# Patient Record
Sex: FEMALE | Race: WHITE | NOT HISPANIC OR LATINO | URBAN - METROPOLITAN AREA
[De-identification: names, ages, dates, MRNs, and addresses within clinical notes are randomized per-mention and may not be internally consistent; named-entity substitution may affect disease eponyms.]

---

## 2017-01-01 ENCOUNTER — HOME CARE/HOSPICE - HEALTHEAST (OUTPATIENT)
Dept: HOME HEALTH SERVICES | Facility: HOME HEALTH | Age: 82
End: 2017-01-01

## 2017-01-01 ENCOUNTER — OFFICE VISIT - HEALTHEAST (OUTPATIENT)
Dept: GERIATRICS | Facility: CLINIC | Age: 82
End: 2017-01-01

## 2017-01-01 ENCOUNTER — HOSPITAL ENCOUNTER (OUTPATIENT)
Dept: NEUROLOGY | Facility: CLINIC | Age: 82
Setting detail: THERAPIES SERIES
Discharge: STILL ADMITTED OR ADMINISTRATIVE | End: 2017-04-12
Attending: PSYCHIATRY & NEUROLOGY

## 2017-01-01 ENCOUNTER — COMMUNICATION - HEALTHEAST (OUTPATIENT)
Dept: INTENSIVE CARE | Facility: HOSPITAL | Age: 82
End: 2017-01-01

## 2017-01-01 ENCOUNTER — OFFICE VISIT - HEALTHEAST (OUTPATIENT)
Dept: CARDIOLOGY | Facility: CLINIC | Age: 82
End: 2017-01-01

## 2017-01-01 ENCOUNTER — HOSPITAL ENCOUNTER (OUTPATIENT)
Dept: NEUROLOGY | Facility: CLINIC | Age: 82
Setting detail: THERAPIES SERIES
Discharge: STILL ADMITTED OR ADMINISTRATIVE | End: 2017-05-05
Attending: PSYCHIATRY & NEUROLOGY

## 2017-01-01 ENCOUNTER — RECORDS - HEALTHEAST (OUTPATIENT)
Dept: LAB | Facility: CLINIC | Age: 82
End: 2017-01-01

## 2017-01-01 ENCOUNTER — HOSPITAL ENCOUNTER (OUTPATIENT)
Dept: OCCUPATIONAL THERAPY | Age: 82
Setting detail: THERAPIES SERIES
Discharge: STILL ADMITTED OR ADMINISTRATIVE | End: 2017-06-01
Attending: NURSE PRACTITIONER

## 2017-01-01 ENCOUNTER — AMBULATORY - HEALTHEAST (OUTPATIENT)
Dept: GERIATRICS | Facility: CLINIC | Age: 82
End: 2017-01-01

## 2017-01-01 ENCOUNTER — RECORDS - HEALTHEAST (OUTPATIENT)
Dept: ADMINISTRATIVE | Facility: OTHER | Age: 82
End: 2017-01-01

## 2017-01-01 ENCOUNTER — AMBULATORY - HEALTHEAST (OUTPATIENT)
Dept: NEUROLOGY | Facility: CLINIC | Age: 82
End: 2017-01-01

## 2017-01-01 ENCOUNTER — AMBULATORY - HEALTHEAST (OUTPATIENT)
Dept: ADMINISTRATIVE | Facility: CLINIC | Age: 82
End: 2017-01-01

## 2017-01-01 ENCOUNTER — COMMUNICATION - HEALTHEAST (OUTPATIENT)
Dept: NEUROLOGY | Facility: CLINIC | Age: 82
End: 2017-01-01

## 2017-01-01 ENCOUNTER — AMBULATORY - HEALTHEAST (OUTPATIENT)
Dept: CARDIOLOGY | Facility: CLINIC | Age: 82
End: 2017-01-01

## 2017-01-01 ENCOUNTER — SURGERY - HEALTHEAST (OUTPATIENT)
Dept: GASTROENTEROLOGY | Facility: HOSPITAL | Age: 82
End: 2017-01-01

## 2017-01-01 ENCOUNTER — HOSPITAL ENCOUNTER (OUTPATIENT)
Dept: NEUROLOGY | Facility: CLINIC | Age: 82
Setting detail: THERAPIES SERIES
Discharge: STILL ADMITTED OR ADMINISTRATIVE | End: 2017-06-09
Attending: NURSE PRACTITIONER

## 2017-01-01 ENCOUNTER — COMMUNICATION - HEALTHEAST (OUTPATIENT)
Dept: CARE COORDINATION | Facility: CLINIC | Age: 82
End: 2017-01-01

## 2017-01-01 ENCOUNTER — HOSPITAL ENCOUNTER (OUTPATIENT)
Dept: SPEECH THERAPY | Age: 82
Setting detail: THERAPIES SERIES
Discharge: STILL ADMITTED OR ADMINISTRATIVE | End: 2017-06-15
Attending: NURSE PRACTITIONER

## 2017-01-01 ENCOUNTER — HOSPITAL ENCOUNTER (OUTPATIENT)
Dept: OCCUPATIONAL THERAPY | Age: 82
Setting detail: THERAPIES SERIES
Discharge: STILL ADMITTED OR ADMINISTRATIVE | End: 2017-06-15
Attending: NURSE PRACTITIONER

## 2017-01-01 ENCOUNTER — HOSPITAL ENCOUNTER (OUTPATIENT)
Dept: PHYSICAL THERAPY | Age: 82
Setting detail: THERAPIES SERIES
Discharge: STILL ADMITTED OR ADMINISTRATIVE | End: 2017-06-15
Attending: NURSE PRACTITIONER

## 2017-01-01 ENCOUNTER — HOSPITAL ENCOUNTER (OUTPATIENT)
Dept: SPEECH THERAPY | Age: 82
Setting detail: THERAPIES SERIES
Discharge: STILL ADMITTED OR ADMINISTRATIVE | End: 2017-06-01
Attending: NURSE PRACTITIONER

## 2017-01-01 ENCOUNTER — HOSPITAL ENCOUNTER (OUTPATIENT)
Dept: NEUROLOGY | Facility: CLINIC | Age: 82
Setting detail: THERAPIES SERIES
Discharge: STILL ADMITTED OR ADMINISTRATIVE | End: 2017-03-15
Attending: PSYCHIATRY & NEUROLOGY

## 2017-01-01 ENCOUNTER — OFFICE VISIT - HEALTHEAST (OUTPATIENT)
Dept: SURGERY | Facility: CLINIC | Age: 82
End: 2017-01-01

## 2017-01-01 DIAGNOSIS — K57.92 DIVERTICULITIS: ICD-10-CM

## 2017-01-01 DIAGNOSIS — K59.00 CONSTIPATION: ICD-10-CM

## 2017-01-01 DIAGNOSIS — G20.A1 PARKINSON'S DISEASE (H): ICD-10-CM

## 2017-01-01 DIAGNOSIS — I95.1 ORTHOSTATIC HYPOTENSION: ICD-10-CM

## 2017-01-01 DIAGNOSIS — R00.0 TACHYCARDIA: ICD-10-CM

## 2017-01-01 DIAGNOSIS — D64.9 ANEMIA: ICD-10-CM

## 2017-01-01 DIAGNOSIS — I10 ESSENTIAL HYPERTENSION: ICD-10-CM

## 2017-01-01 DIAGNOSIS — R42 DIZZINESS: ICD-10-CM

## 2017-01-01 DIAGNOSIS — R49.0 DYSPHONIA: ICD-10-CM

## 2017-01-01 DIAGNOSIS — D64.9 ANEMIA, UNSPECIFIED TYPE: ICD-10-CM

## 2017-01-01 DIAGNOSIS — F32.A DEPRESSION: ICD-10-CM

## 2017-01-01 DIAGNOSIS — D64.9 SYMPTOMATIC ANEMIA: ICD-10-CM

## 2017-01-01 DIAGNOSIS — G20.A1 PARKINSON DISEASE (H): ICD-10-CM

## 2017-01-01 DIAGNOSIS — F03.90 DEMENTIA (H): ICD-10-CM

## 2017-01-01 DIAGNOSIS — K57.92 ACUTE DIVERTICULITIS: ICD-10-CM

## 2017-01-01 DIAGNOSIS — R53.1 WEAKNESS: ICD-10-CM

## 2017-01-01 DIAGNOSIS — K31.819 GASTRIC AVM: ICD-10-CM

## 2017-01-01 DIAGNOSIS — R53.81 DEBILITY: ICD-10-CM

## 2017-01-01 DIAGNOSIS — I10 HTN (HYPERTENSION), BENIGN: ICD-10-CM

## 2017-01-01 DIAGNOSIS — R27.9 LACK OF COORDINATION: ICD-10-CM

## 2017-01-01 DIAGNOSIS — R93.89 ABNORMAL CT SCAN: ICD-10-CM

## 2017-01-01 DIAGNOSIS — I10 HYPERTENSION: ICD-10-CM

## 2017-01-01 DIAGNOSIS — E87.6 HYPOKALEMIA: ICD-10-CM

## 2017-01-01 DIAGNOSIS — K92.1 MELENA: ICD-10-CM

## 2017-01-01 DIAGNOSIS — R53.81 PHYSICAL DECONDITIONING: ICD-10-CM

## 2017-01-01 DIAGNOSIS — R06.09 DOE (DYSPNEA ON EXERTION): ICD-10-CM

## 2017-01-01 DIAGNOSIS — R26.89 DECREASED FUNCTIONAL MOBILITY: ICD-10-CM

## 2017-01-01 DIAGNOSIS — I10 HTN (HYPERTENSION): ICD-10-CM

## 2017-01-01 DIAGNOSIS — R25.1 TREMOR: ICD-10-CM
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
## 2017-01-01 DIAGNOSIS — R06.82 TACHYPNEA: ICD-10-CM

## 2017-01-01 DIAGNOSIS — R53.81 DEBILITY, UNSPECIFIED: ICD-10-CM

## 2017-01-01 DIAGNOSIS — K63.89 COLONIC MASS: ICD-10-CM

## 2017-01-01 DIAGNOSIS — G47.52 REM SLEEP BEHAVIOR DISORDER: ICD-10-CM

## 2017-01-01 DIAGNOSIS — R53.1 WEAKNESS GENERALIZED: ICD-10-CM

## 2017-01-01 DIAGNOSIS — G31.84 MCI (MILD COGNITIVE IMPAIRMENT): ICD-10-CM

## 2017-01-01 DIAGNOSIS — R53.1 GENERAL WEAKNESS: ICD-10-CM

## 2017-01-01 DIAGNOSIS — K92.2 ACUTE UPPER GI BLEED: ICD-10-CM

## 2017-01-01 DIAGNOSIS — R53.81 DEBILITATED PATIENT: ICD-10-CM

## 2017-01-01 DIAGNOSIS — R41.89 COGNITIVE DECLINE: ICD-10-CM

## 2017-01-01 DIAGNOSIS — I99.8 BLOOD PRESSURE INSTABILITY: ICD-10-CM

## 2017-01-01 DIAGNOSIS — I95.9 HYPOTENSION: ICD-10-CM

## 2017-01-01 DIAGNOSIS — R62.7 FTT (FAILURE TO THRIVE) IN ADULT: ICD-10-CM

## 2017-01-01 LAB
BASOPHILS # BLD AUTO: 0 THOU/UL (ref 0–0.2)
BASOPHILS NFR BLD AUTO: 1 % (ref 0–2)
CEA SERPL-MCNC: 1.7 NG/ML (ref 0–3)
EOSINOPHIL # BLD AUTO: 0 THOU/UL (ref 0–0.4)
EOSINOPHIL NFR BLD AUTO: 0 % (ref 0–6)
ERYTHROCYTE [DISTWIDTH] IN BLOOD BY AUTOMATED COUNT: 26.1 % (ref 11–14.5)
HCT VFR BLD AUTO: 30.8 % (ref 35–47)
HGB BLD-MCNC: 9 G/DL (ref 12–16)
LAB AP CHARGES (HE HISTORICAL CONVERSION): NORMAL
LYMPHOCYTES # BLD AUTO: 1.6 THOU/UL (ref 0.8–4.4)
LYMPHOCYTES NFR BLD AUTO: 25 % (ref 20–40)
MCH RBC QN AUTO: 24.3 PG (ref 27–34)
MCHC RBC AUTO-ENTMCNC: 29.2 G/DL (ref 32–36)
MCV RBC AUTO: 83 FL (ref 80–100)
MONOCYTES # BLD AUTO: 0.8 THOU/UL (ref 0–0.9)
MONOCYTES NFR BLD AUTO: 12 % (ref 2–10)
NEUTROPHILS # BLD AUTO: 3.9 THOU/UL (ref 2–7.7)
NEUTROPHILS NFR BLD AUTO: 63 % (ref 50–70)
PATH REPORT.COMMENTS IMP SPEC: NORMAL
PATH REPORT.COMMENTS IMP SPEC: NORMAL
PATH REPORT.FINAL DX SPEC: NORMAL
PATH REPORT.MICROSCOPIC SPEC OTHER STN: ABNORMAL
PLATELET # BLD AUTO: 188 THOU/UL (ref 140–440)
PMV BLD AUTO: 11 FL (ref 8.5–12.5)
RBC # BLD AUTO: 3.7 MILL/UL (ref 3.8–5.4)
WBC: 6.3 THOU/UL (ref 4–11)

## 2017-01-01 RX ORDER — CARBIDOPA, LEVODOPA AND ENTACAPONE 50; 200; 200 MG/1; MG/1; MG/1
0.5 TABLET, FILM COATED ORAL 2 TIMES DAILY
Status: SHIPPED | COMMUNITY
Start: 2017-01-01

## 2017-01-01 RX ORDER — AMLODIPINE BESYLATE 2.5 MG/1
2.5 TABLET ORAL DAILY
Status: SHIPPED | COMMUNITY
Start: 2017-01-01

## 2017-01-01 RX ORDER — POLYETHYLENE GLYCOL 3350 17 G/17G
17 POWDER, FOR SOLUTION ORAL DAILY
Status: SHIPPED | COMMUNITY
Start: 2017-01-01

## 2017-01-01 RX ORDER — MECLIZINE HCL 12.5 MG 12.5 MG/1
12.5 TABLET ORAL DAILY
Status: SHIPPED | COMMUNITY
Start: 2017-01-01

## 2017-01-01 RX ORDER — ACETAMINOPHEN 325 MG/1
650 TABLET ORAL 4 TIMES DAILY PRN
Status: SHIPPED | COMMUNITY
Start: 2017-01-01

## 2017-01-01 ASSESSMENT — MIFFLIN-ST. JEOR
SCORE: 1139.39
SCORE: 1138.94
SCORE: 1120.34
SCORE: 1106.74
SCORE: 1123.97
SCORE: 997.87
SCORE: 1161.62

## 2021-05-25 ENCOUNTER — RECORDS - HEALTHEAST (OUTPATIENT)
Dept: ADMINISTRATIVE | Facility: CLINIC | Age: 86
End: 2021-05-25

## 2021-05-26 ENCOUNTER — RECORDS - HEALTHEAST (OUTPATIENT)
Dept: ADMINISTRATIVE | Facility: CLINIC | Age: 86
End: 2021-05-26

## 2021-05-27 ENCOUNTER — RECORDS - HEALTHEAST (OUTPATIENT)
Dept: ADMINISTRATIVE | Facility: CLINIC | Age: 86
End: 2021-05-27

## 2021-05-28 ENCOUNTER — RECORDS - HEALTHEAST (OUTPATIENT)
Dept: ADMINISTRATIVE | Facility: CLINIC | Age: 86
End: 2021-05-28

## 2021-05-29 ENCOUNTER — RECORDS - HEALTHEAST (OUTPATIENT)
Dept: ADMINISTRATIVE | Facility: CLINIC | Age: 86
End: 2021-05-29

## 2021-05-30 VITALS — WEIGHT: 127 LBS | BODY MASS INDEX: 18.75 KG/M2

## 2021-05-30 VITALS — WEIGHT: 127.6 LBS | BODY MASS INDEX: 18.84 KG/M2

## 2021-05-30 VITALS — HEIGHT: 69 IN | BODY MASS INDEX: 21.05 KG/M2 | WEIGHT: 142.1 LBS

## 2021-05-30 VITALS — BODY MASS INDEX: 18.87 KG/M2 | WEIGHT: 127.8 LBS

## 2021-05-30 VITALS — BODY MASS INDEX: 19.18 KG/M2 | WEIGHT: 129.9 LBS

## 2021-05-30 VITALS — BODY MASS INDEX: 19.82 KG/M2 | WEIGHT: 134.2 LBS

## 2021-05-30 VITALS — BODY MASS INDEX: 18.78 KG/M2 | WEIGHT: 127.2 LBS

## 2021-05-30 VITALS — BODY MASS INDEX: 20.88 KG/M2 | WEIGHT: 141.4 LBS

## 2021-05-30 VITALS — WEIGHT: 131.2 LBS | BODY MASS INDEX: 19.37 KG/M2

## 2021-05-31 VITALS — HEIGHT: 66 IN | WEIGHT: 121.5 LBS | BODY MASS INDEX: 19.53 KG/M2

## 2021-06-02 ENCOUNTER — RECORDS - HEALTHEAST (OUTPATIENT)
Dept: ADMINISTRATIVE | Facility: CLINIC | Age: 86
End: 2021-06-02

## 2021-06-09 NOTE — PROGRESS NOTES
Assessment:     1. Parkinson's Disease  2. Depression  3. History of low Hgb  4. Generalized weakness    Plan:     PD: no changes in medications  Therapies: continue with home health  Exercise- encouraged  REM Sleep Disorder -start Nuplazid   Falls - monitor  Anxiety/Depressed- start Nuplazid, continue with Remeron and Venlafaxine  Next Appointment - with me in 4 weeks    The patient returns to the [facility] for a follow up visit, the patient has been in the hospital due to low Hgb, she feels really week. She complains of daytime sleepiness and reports that her sleep is poor so I will start the patient on Nuplazid. I did ask the facility to check CBC, BMP and the check orthostatic blood pressure manually.     Subjective:        PD summary: Dr. Black initially saw Mrs. Hills in 8/2013. She is a right handed white female who started to have right side tremor about in about 2010. He was initially diagnosed with Parkinson disease by her neurologist at [facility]. She has been placed on carbidopa, levodopa. She was increased to 50/200 extended release 3 times a day but she felt lightheaded and dizzy, subsequently she decreased to 1 pill in the morning, half tablets in the early afternoon. Hand writing was getting smaller. She did feel the medication was helping her left-sided tremor. She has some mild balance issue but it was multifactorial. She has been seen at [facility], she was not satisfied with their care, came to this clinic after contacting [facility] and after looking up all the doctors names for PD. Last visit with Mariel[facility] was about summer of 2015. Was not been able to walk Sept 2015, due to weakness. Has therapies without benefit (PD therapies.). She took her self off Sinemet 25/250 TID thanksgiving of 2015. Still took Sinemet CR 50/200 HS. She cannot push up to get out of chair, speech softer, Can walk only cross the room, about 4.5 min for 25 feet. Very slow. Right leg drags more.  No tremor, feels SOB. No known lung and heart conditions. She is trying inspirators. She feels in general, she is a little clumsy feeding herself, but no particularly dysphagia. Symptoms worsen after the d/c of the sinemet.     She has been very active and exercise regularly. She lives with her son who helps with things around the house. She does have a cane, normally walks with that. She is hard of hearing and wears hearing aids. She did lost right side hearing aid, but is wearing the left side. She has a hysterectomy, bilateral hip surgery x4, knee surgery in 2006, had cholecystectomy and left kidney stones. She has a history of hypertension, osteoporosis, asthma, monoclonal gammopathy of unknown significance, knee surgery, glaucoma, hypernephroma surgically resected on the left side.     PD: Sinemet 25/100, 2 tabs BID noon and 5:30 pm. Taking 50/200 CR 1 tab BID at 8:30 am, and 10 pm. NO side effects. NO clear wearing off  RLS- . denies  ADL -  Is assist of one with ADL, she is able to ambulate but she is very week  Driving- does not drive   Exercise - presently has therapies coming to her house  Falls - denies  History of low Hgb- was in the hospital for 6 days Hgb was 6.0, is being monitored by primary  Medication Side Effects - denies any side effects  Memory/Mild cognitive impairment -feeling ok. No changes. MOCA 25/30 in 5/2016  Anxiety/Depression - patient is very tired, is currently on Remeron and Venlafaxine, will see if sleeping helps mood  REM sleep disorder: complains of vivid dreams, daytime sleepiness, her sleep is poor, she never feels rested start Nuplazid  Dyskinesia- none  Orthostatics: reports no light headedness  Advanced Directives - completed  Surgery - dementia level of impairment not a good candidate  Drooling - denies problem    Patient Active Problem List    Diagnosis Date Noted     Constipation 03/08/2017     Symptomatic anemia 02/27/2017     Fall 02/27/2017     SOB (shortness of breath)  02/27/2017     Gastric AVM 02/27/2017     Melena 02/27/2017     Parkinson disease 05/18/2016     Arthritis 05/18/2016     Memory difficulty 05/18/2016     Drooling 05/18/2016     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     HTN (hypertension), benign 05/18/2016     [place] (hard of hearing), bilateral 05/18/2016     Tremor 08/02/2014     Past Medical History:   Diagnosis Date     Anemia      Asthma      Glaucoma      HTN (hypertension)      OP (osteoporosis)      Parkinson disease      Vitamin D deficiency      Word finding difficulty 2016     Past Surgical History:   Procedure Laterality Date     ESOPHAGOGASTRODUODENOSCOPY N/A 2/27/2017    Procedure: ESOPHAGOGASTRODUODENOSCOPY (EGD) with APC cautery usage;  Surgeon: Kelton Sr MD;  Location: [facility];  Service:      HIP ARTHROPLASTY Right      HIP SURGERY Left      HYSTERECTOMY       KNEE SURGERY Right      NEPHRECTOMY       Family History   Problem Relation Age of Onset     Parkinsonism Paternal Uncle      Heart disease Mother      Heart attack Father      Pneumonia Brother      Heart murmur Daughter      Diabetes Son      No Medical Problems Son      Current Outpatient Prescriptions   Medication Sig Dispense Refill     carbidopa-levodopa (SINEMET CR)  mg per tablet Take 0.5-1 tablets by mouth 2 (two) times a day. Take 1 tab every am and take 0.5 tab at 1500        carbidopa-levodopa (SINEMET)  mg per tablet Take 2 tablets by mouth 2 (two) times a day. Take around noon and 6pm       cholecalciferol, vitamin D3, 5,000 unit Tab Take 1 tablet by mouth daily.       ferrous sulfate 325 (65 FE) MG tablet Take 1 tablet by mouth daily with breakfast.       mirtazapine (REMERON) 45 MG tablet Take 45 mg by mouth bedtime.       omeprazole (PRILOSEC) 20 MG capsule Take 1 capsule (20 mg total) by mouth 2 (two) times a day before meals. 60 capsule 0     metoprolol tartrate (LOPRESSOR) 25 MG tablet Take 25 mg by mouth 2 (two) times a day. Indications:  hypertension       pimavanserin (NUPLAZID) 17 mg Tab tablet Take 2 tablets (34 mg total) by mouth daily. 60 tablet 6     venlafaxine (EFFEXOR XR) 37.5 MG 24 hr capsule Take 1 capsule (37.5 mg total) by mouth daily. 30 capsule 3     No current facility-administered medications for this encounter.        Allergies   Allergen Reactions     Crestor [Rosuvastatin]      Paroxetine      Penicillins      Pramipexole      Statins-Hmg-Coa Reductase Inhibitors      Zetia [Ezetimibe]      Social History     Social History     Marital status:      Spouse name: N/A     Number of children: N/A     Years of education: N/A     Occupational History     Not on file.     Social History Main Topics     Smoking status: Never Smoker     Smokeless tobacco: Never Used     Alcohol use No     Drug use: No     Sexual activity: No     Other Topics Concern     Not on file     Social History Narrative    Her son lives with her in a single family home       The following portions of the patient's history were reviewed and updated as appropriate: allergies, current medications, past family history, past medical history, past social history, past surgical history and problem list.    Review of Systems  A comprehensive review of systems was negative except for: what was noted above      Objective:     Vitals:    03/15/17 1343   BP: 140/70   Pulse: 66     I asked her to call with any questions or concerns and will see her again in clinic in about 4 weeks . We discussed the risks and benefits of the medication including risk of worsening depression with medication adjustments and even the possibility of emergence of suicidally      Total time spent with the patient today was 45 minutes with greater than 50% of the time spent in counseling and care coordination.       She is a very thin and frail looking, but looking better. Left leg is about 1 to 2 inches shorter than the right. Right ankle with brace. She has normal mental status, language and  but a little soft speech. Cranial nerves 2 through 12 significant for hard of hearing (She did lost right side hearing aid, but is wearing the left side) and wearing glasses, otherwise unremarkable. She is sitting in chair comfortable, in no acute distress. Language normal. She has decreased muscle bulk in right legs and complete foot drop on the right side with flaccid tone around the ankle. Some numb feeling in right leg. Absent deep tendon reflexes in bilateral ankles, diminished in the knees bilaterally.  No Babinski sign. No tremors, no rigidities in upper extremities. Hand coordination seemed to be poor, worse on right, decreased amplitudes. Some ulnar deviation of fingers and contractions in hands, worse on right. She does have more spontaneous movement in the left side upper extremity, compared to the right side. Gait and station deferred. There is no leg swelling at this point. She is atraumatic, normocephalic. Some drooling.

## 2021-06-09 NOTE — PROGRESS NOTES
Code Status:  FULL CODE  Visit Type: Problem Visit (htn,loose stool)     Facility:  [facility] [[id number]]        Facility Type: SNF (Skilled Nursing Facility, TCU)    History of Present Illness: Kita Hills is a 84 y.o. female Who I'm seen today because of purported loose stools and following up on her laboratory in blood pressures. Apparently she was given like you lost beyond the one day that was requested and was just discontinued several days ago. In addition she continued on her senna S for regimen and because of this a to loose stools yesterday a. This is her report staff did not take it that themselves. Additionally she is feeling slightly weaker because of the above. No other complaints this time     Review of Systems     Physical Exam   Constitutional:   Disappear a somewhat a weak but not sedated   Cardiovascular: Normal rate.    Pulmonary/Chest: Effort normal and breath sounds normal.   Abdominal: Soft. Bowel sounds are normal. She exhibits no distension. There is no tenderness. There is no rebound.   Vitals reviewed.      Labs:  All labs reviewed in the nursing home record.  Recent Results (from the past 240 hour(s))   Iron   Result Value Ref Range    Iron 75 42 - 175 ug/dL   Potassium   Result Value Ref Range    Potassium 3.6 3.5 - 5.0 mmol/L   HM1 (CBC with Diff)   Result Value Ref Range    WBC 7.2 4.0 - 11.0 thou/uL    RBC 4.00 3.80 - 5.40 mill/uL    Hemoglobin 9.1 (L) 12.0 - 16.0 g/dL    Hematocrit 32.5 (L) 35.0 - 47.0 %    MCV 81 80 - 100 fL    MCH 22.8 (L) 27.0 - 34.0 pg    MCHC 28.0 (L) 32.0 - 36.0 g/dL    RDW 24.4 (H) 11.0 - 14.5 %    Platelets 262 140 - 440 thou/uL    MPV 11.6 8.5 - 12.5 fL   Manual Differential   Result Value Ref Range    Total Neutrophils % 61 50 - 70 %    Lymphocytes % 32 20 - 40 %    Monocytes % 5 2 - 10 %    Eosinophils %  1 0 - 6 %    Basophils % 0 0 - 2 %    Myelocytes % 1 <=1 %    Total Neutrophils Absolute 4.4 2.0 - 7.7 thou/ul    Lymphocytes  Absolute 2.3 0.8 - 4.4 thou/uL    Monocytes Absolute 0.4 0.0 - 0.9 thou/uL    Eosinophils Absolute 0.1 0.0 - 0.4 thou/uL    Basophils Absolute 0.0 0.0 - 0.2 thou/uL    Myelocytes Absolute 0.1 <=0.1 thou/uL    Platelet Estimate Normal Normal    Ovalocytes 1+ (!) Negative    Polychromasia 1+ (!) Negative   Basic Metabolic Panel   Result Value Ref Range    Sodium 139 136 - 145 mmol/L    Potassium 3.7 3.5 - 5.0 mmol/L    Chloride 101 98 - 107 mmol/L    CO2 29 22 - 31 mmol/L    Anion Gap, Calculation 9 5 - 18 mmol/L    Glucose 78 70 - 125 mg/dL    Calcium 9.1 8.5 - 10.5 mg/dL    BUN 21 8 - 28 mg/dL    Creatinine 0.79 0.60 - 1.10 mg/dL    GFR MDRD Af Amer >60 >60 mL/min/1.73m2    GFR MDRD Non Af Amer >60 >60 mL/min/1.73m2   HM2(CBC w/o Differential)   Result Value Ref Range    WBC 6.3 4.0 - 11.0 thou/uL    RBC 3.84 3.80 - 5.40 mill/uL    Hemoglobin 9.3 (L) 12.0 - 16.0 g/dL    Hematocrit 31.2 (L) 35.0 - 47.0 %    MCV 81 80 - 100 fL    MCH 24.2 (L) 27.0 - 34.0 pg    MCHC 29.8 (L) 32.0 - 36.0 g/dL    RDW 25.6 (H) 11.0 - 14.5 %    Platelets 198 140 - 440 thou/uL    MPV 11.0 8.5 - 12.5 fL         Assessment:  1. Constipation     2. Hypertension     3. Hypokalemia         Plan: We will adjust her bow regimen so that she does not get the lactulose. The senna S will be reduced to one BID PRN constipation. We reviewed that her creatinine did improve to normal from 1.13 -.79 and her potassium also improve from 3.4- 3.7. Her blood pressure has waffled somewhat with low reading in the 90 systolic yesterday and 144 today. We will continue to observe this.      25 minutes spent of which greater than 75% was face to face communication with the patient about above plan of care    Electronically signed by: Vignesh Vaz MD

## 2021-06-09 NOTE — PROGRESS NOTES
Code Status:  FULL CODE  Visit Type: H & P     Facility:  [facility] [[id number]]      Facility Type: SNF (Skilled Nursing Facility, TCU)    History of Present Illness:   Hospital Admission Date: February 26, 2017 [facility] Discharge Date: March 2, 2017  Facility Admission Date: March 2, 2017 [facility]    Kita Hills is a 84 y.o. female Was an underlying history of Parkinson's disease, hypertension and chronic constipation. She had become the progressively short of breath we for 3 to 4 days nonproductive cough and had had a mechanical fall. She had no loss of consciousness or head injury during the fall.    Emergency department course patient was seen in the emergency department and was found to have a hemoglobin of six which was a 5 g drop from the previous year. For this reason she was worked up. She did at that juncture admit to [facility] not ask stools. She never had bright red blood per rectum. She had a brief episode of urinary incontinence prior to this. There had been no history of dyspepsia or nonsteroidal anti-inflammatory use, tobacco, alcohol, but her appetite had been decreased in she'd been overall getting weaker.    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 She had no further bleeding in her hemoglobin stabilized after that. They started her on omeprazole and recommended because of her we can state a stay in the TCU for her functional decline. She does live with her son but this concerned that they may not be able to fully care for her and trial and facility to strengthen to see if she can get back to her baseline. They did in addition to the two units of packed red cells give her vendor for partial dose prior to discharge. They felt that her iron stores were probably depleted.    Past Medical History:   Diagnosis Date     Anemia      Asthma       Glaucoma      HTN (hypertension)      OP (osteoporosis)      Parkinson disease      Vitamin D deficiency      Word finding difficulty 2016     Past Surgical History:   Procedure Laterality Date     ESOPHAGOGASTRODUODENOSCOPY N/A 2/27/2017    Procedure: ESOPHAGOGASTRODUODENOSCOPY (EGD) with APC cautery usage;  Surgeon: Kelton Sr MD;  Location: [facility];  Service:      HIP ARTHROPLASTY Right      HIP SURGERY Left      HYSTERECTOMY       KNEE SURGERY Right      NEPHRECTOMY       Family History   Problem Relation Age of Onset     Parkinsonism Paternal Uncle      Heart disease Mother      Heart attack Father      Pneumonia Brother      Heart murmur Daughter      Diabetes Son      No Medical Problems Son      Social History     Social History     Marital status:      Spouse name: N/A     Number of children: N/A     Years of education: N/A     Occupational History     Not on file.     Social History Main Topics     Smoking status: Never Smoker     Smokeless tobacco: Never Used     Alcohol use No     Drug use: No     Sexual activity: No     Other Topics Concern     Not on file     Social History Narrative    Her son lives with her in a single family home     Additional Geriatric ReviewPatient has not driven for three years her  passed away three years ago. She she does not cook and lives with her son who does most of her cares. She is difficulties with stairs. Certain things she still very cognitively sharp with. She said constipation her whole life. She uses prune juice mostly. There is concerned about some cognitive impairment.  Current Outpatient Prescriptions   Medication Sig Dispense Refill     carbidopa-levodopa (SINEMET CR)  mg per tablet Take 0.5-1 tablets by mouth 2 (two) times a day. Take 1 tab every am and take 0.5 tab at 1500        carbidopa-levodopa (SINEMET)  mg per tablet Take 2 tablets by mouth 2 (two) times a day. Take around noon and 6pm       cholecalciferol, vitamin  D3, 5,000 unit Tab Take 1 tablet by mouth daily.       cloNIDine (CATAPRES) 0.3 MG tablet Take 0.3 mg by mouth 2 (two) times a day.       ferrous sulfate 325 (65 FE) MG tablet Take 1 tablet by mouth daily with breakfast.       hydrochlorothiazide (HYDRODIURIL) 25 MG tablet Take 25 mg by mouth daily.       mirtazapine (REMERON) 45 MG tablet Take 45 mg by mouth bedtime.       omeprazole (PRILOSEC) 20 MG capsule Take 1 capsule (20 mg total) by mouth 2 (two) times a day before meals. 60 capsule 0     venlafaxine (EFFEXOR XR) 37.5 MG 24 hr capsule Take 1 capsule (37.5 mg total) by mouth daily. 30 capsule 3     verapamil (CALAN-SR) 240 MG CR tablet Take 240 mg by mouth bedtime.   0     No current facility-administered medications for this visit.      Allergies   Allergen Reactions     Crestor [Rosuvastatin]      Paroxetine      Penicillins      Pramipexole      Statins-Hmg-Coa Reductase Inhibitors      Zetia [Ezetimibe]      Immunization History   Administered Date(s) Administered     Influenza, inj, historic 10/14/2016     Pneumo Polysac 23-V 03/09/1999         Review of Systems   Constitutional: Positive for fatigue. Negative for appetite change, chills, diaphoresis, fever and unexpected weight change.   HENT: Negative for congestion, dental problem, ear pain, hearing loss, nosebleeds, sinus pressure, sore throat, tinnitus and trouble swallowing.    Eyes: Negative for photophobia, pain, discharge, itching and visual disturbance.   Respiratory: Negative for apnea, cough, chest tightness, shortness of breath and wheezing.    Cardiovascular: Negative for chest pain and palpitations.   Gastrointestinal: Positive for constipation. Negative for abdominal distention, abdominal pain and diarrhea.        Reports no bowel movement since her hospitalization on the 26th   Endocrine: Negative for cold intolerance, heat intolerance and polydipsia.   Genitourinary: Negative.  Negative for decreased urine volume, difficulty urinating,  dysuria, enuresis, frequency, hematuria, menstrual problem, urgency and vaginal discharge.   Musculoskeletal: Negative for arthralgias, back pain and gait problem.   Allergic/Immunologic: Negative.    Neurological: Positive for weakness. Negative for dizziness, tremors, syncope, facial asymmetry, speech difficulty, light-headedness, numbness and headaches.   Hematological: Negative.    Psychiatric/Behavioral: Negative for agitation, behavioral problems, confusion, decreased concentration, dysphoric mood and hallucinations. The patient is not hyperactive.         Physical Exam   Constitutional: She is oriented to person, place, and time. She appears well-developed and well-nourished.   HENT:   Head: Normocephalic and atraumatic.   Right Ear: External ear normal.   Left Ear: External ear normal.   Nose: Nose normal.   Mouth/Throat: Oropharynx is clear and moist.   Eyes: Conjunctivae and EOM are normal. Pupils are equal, round, and reactive to light. Left eye exhibits no discharge. No scleral icterus.   Neck: Neck supple. No JVD present. No tracheal deviation present. No thyromegaly present.   Cardiovascular: Normal rate, regular rhythm and normal heart sounds.  Exam reveals no friction rub.    No murmur heard.  Pulmonary/Chest: Effort normal and breath sounds normal. No respiratory distress. She has no wheezes. She has no rales. She exhibits no tenderness.   Abdominal: Soft. She exhibits distension. She exhibits no mass. There is no tenderness. There is no guarding.   Patient is bloated tympaneitic higher pitch bowel sounds   Musculoskeletal: Normal range of motion. She exhibits deformity. She exhibits no edema or tenderness.   Hands with significant arthritic change throughout favor osteoarthritic type   Lymphadenopathy:     She has no cervical adenopathy.   Neurological: She is alert and oriented to person, place, and time. She has normal reflexes. No cranial nerve deficit. She exhibits normal muscle tone.  Coordination normal.   Skin: Skin is warm and dry. No rash noted. No erythema.   Psychiatric: She has a normal mood and affect. Her behavior is normal. Thought content normal.         Labs:    Recent Results (from the past 240 hour(s))   Troponin I   Result Value Ref Range    Troponin I <0.01 0.00 - 0.29 ng/mL   INR   Result Value Ref Range    INR 1.17 (H) 0.90 - 1.10   APTT   Result Value Ref Range    PTT 25 24 - 37 seconds   HM2 (CBC W/O DIFF)   Result Value Ref Range    WBC 8.8 4.0 - 11.0 thou/uL    RBC 3.00 (L) 3.80 - 5.40 mill/uL    Hemoglobin 6.0 (LL) 12.0 - 16.0 g/dL    Hematocrit 20.0 (L) 35.0 - 47.0 %    MCV 67 (L) 80 - 100 fL    MCH 20.0 (L) 27.0 - 34.0 pg    MCHC 29.9 (L) 32.0 - 36.0 g/dL    RDW 20.0 (H) 11.0 - 14.5 %    Platelets 167 140 - 440 thou/uL    MPV 8.4 7.0 - 10.0 fL   Basic Metabolic Panel   Result Value Ref Range    Sodium 136 136 - 145 mmol/L    Potassium 3.3 (L) 3.5 - 5.0 mmol/L    Chloride 103 98 - 107 mmol/L    CO2 25 22 - 31 mmol/L    Anion Gap, Calculation 8 5 - 18 mmol/L    Glucose 158 (H) 70 - 125 mg/dL    Calcium 8.1 (L) 8.5 - 10.5 mg/dL    BUN 18 8 - 28 mg/dL    Creatinine 0.79 0.60 - 1.10 mg/dL    GFR MDRD Af Amer >60 >60 mL/min/1.73m2    GFR MDRD Non Af Amer >60 >60 mL/min/1.73m2   BNP(B-type Natriuretic Peptide)   Result Value Ref Range     (H) 0 - 167 pg/mL   Influenza A/B Rapid Test   Result Value Ref Range    Influenza  A, Rapid Antigen No Influenza A antigen detected No Influenza A antigen detected    Influenza B, Rapid Antigen No Influenza B antigen detected No Influenza B antigen detected   Iron and Transferrin Iron Binding Capacity   Result Value Ref Range    Iron 16 (L) 42 - 175 ug/dL    Transferrin 236 212 - 360 mg/dL    Transferrin Saturation 5 (L) 20 - 50 %    Transferrin  (L) 313 - 563 ug/dL   Morphology,Smear Review (MORP)   Result Value Ref Range    Pathology, Smear Review See Separate Pathology Report (!) (none)    WBC 8.8 4.0 - 11.0 thou/uL    RBC  "2.97 (L) 3.80 - 5.40 mill/uL    Hemoglobin 6.1 (LL) 12.0 - 16.0 g/dL    Hematocrit 19.8 (L) 35.0 - 47.0 %    MCV 67 (L) 80 - 100 fL    MCH 20.5 (L) 27.0 - 34.0 pg    MCHC 30.8 (L) 32.0 - 36.0 g/dL    RDW 20.4 (H) 11.0 - 14.5 %    Platelets 166 140 - 440 thou/uL    MPV 8.3 7.0 - 10.0 fL   Manual Differential   Result Value Ref Range    Total Neutrophils % 84 (H) 50 - 70 %    Lymphocytes % 9 (L) 20 - 40 %    Monocytes % 7 2 - 10 %    Eosinophils %  0 0 - 6 %    Basophils % 0 0 - 2 %    Total Neutrophils Absolute 7.4 2.0 - 7.7 thou/ul    Lymphocytes Absolute 0.8 0.8 - 4.4 thou/uL    Monocytes Absolute 0.6 0.0 - 0.9 thou/uL    Eosinophils Absolute 0.0 0.0 - 0.4 thou/uL    Basophils Absolute 0.0 0.0 - 0.2 thou/uL    Platelet Estimate Normal Normal    Ovalocytes 1+ (!) Negative    Polychromasia 1+ (!) Negative   Peripheral Blood Smear, Path Review   Result Value Ref Range    Case Report       Peripheral Blood Morphology                       Case: [id number]                                   Authorizing Provider:  Nithya Hansen MD  Collected:           02/26/2017 2246              Ordering Location:     [place] [id number]     Received:            02/27/2017 0937              Pathologist:           Juan Johnson MD                                                         Specimen:    Peripheral Blood                                                                           Final Diagnosis       PERIPHERAL BLOOD:    - MICROCYTIC HYPOCHROMIC ANEMIA    - REACTIVE-APPEARING LYMPHOCYTES AND MONOCYTES ARE PRESENT    - FEW NEUTROPHILS WITH TOXIC CHANGES    - NEGATIVE FOR ACUTE LEUKEMIA     - UNREMARKABLE PLATELETS    - PLEASE SEE COMMENT    Comment       Many of the red blood cells manifest expansion of central pallor. Occasional target cells are present. Several elliptocytes or so-called \"pencil cells\" are present. Rare schistocytes are present. The red blood cell indices and the morphologic findings are most " suggestive of iron deficiency anemia. Recommend correlation with serum iron studies for additional information. Due to the presence of rare schistocytes, the differential diagnosis could be broadened to include low-grade intravascular hemolysis. Additional laboratory testing should include serum iron studies and serum haptoglobin. If a diagnosis of iron deficiency anemia is established, additional clinical evaluation to exclude occult bleeding (e.g., from the gastrointestinal tract) should be considered.    Charges CPT: 18853  ICD-10: D50.9    ECG 12 lead with MUSE   Result Value Ref Range    SYSTOLIC BLOOD PRESSURE  mmHg    DIASTOLIC BLOOD PRESSURE  mmHg    VENTRICULAR RATE 71 BPM    ATRIAL RATE 71 BPM    P-R INTERVAL 142 ms    QRS DURATION 86 ms    Q-T INTERVAL 430 ms    QTC CALCULATION (BEZET) 467 ms    P Axis 62 degrees    R AXIS 63 degrees    T AXIS 72 degrees    MUSE DIAGNOSIS       Normal sinus rhythm with occasional Premature atrial complexes  Normal ECG  When compared with ECG of 06-AUG-2010 14:01,  Complete heart block no longer evident  Confirmed by SINDY YOUSSEF MD LOC:[facility] ([id number]) on 2/27/2017 9:58:55 AM     Type and Screen   Result Value Ref Range    ABORh O POS     Antibody Screen Negative Negative   POCT occult blood stool   Result Value Ref Range    POC Fecal Occult Bld Negative Negative    Lot Number [id number]     Expiration Date 9-19     Developer Lot Number [id number]     Developer Expiration Date 2020-2     Pos Control Valid Control Valid Control    Neg Control Valid Control Valid Control   Echo Complete   Result Value Ref Range    LV volume systolic 31.6 14 - 42 cm3    LV volume diastolic 81.3 46 - 106 cm3    LVOT peak VTI 23.4 cm    LVOT mean roxanna 83.8 cm/s    LVOT diam 1.74 cm    LVOT mean gradient 3.16 mmHg    LV PWd 0.925 0.6 - 0.9 cm    LVOT peak gradient 5.18 mmHg    IVSd 0.996 0.6 - 0.9 cm    LVIDs 3.25 2.2 - 3.5 cm    LVIDd 4.72 3.8 - 5.2 cm    LA size 3.65 cm    MV peak E roxanna 94.5 cm/s    MV peak A  roxanna 46.5 cm/s    MV decel time 0.193 s    AO root 2.62 cm    AO root 2.62 cm    BSA 1.74 m2    Hieght 69 in    Weight 2208 lbs    /72 mmHg    HR 71 bpm    IVS/PW ratio 1.1     TR peak gradent 46.5 mmHg    LV FS 31.1 28 - 44 %    Echo LVEF 61 55 - 75 %    LA volume 74.2 cm3    LV mass 156.8 g    TR peak roxanna 341.0 cm/s    MV E/A Ratio 2.0     LVOT area 2.38 cm2    LVOT SV 55.6 cm3    LV systolic volume index 18.2 11 - 31 cm3/m2    LV diastolic volume index 46.7 34 - 74 cm3/m2    LA volume index 42.6 mL/m2    LV mass index 90.1 g/m2    LV SVi 32.0 ml/m2    TAPSE 2.9 cm    LV CO 3.9 l/min    LV Ci 2.3 l/min/m2    LA area 2 22 cm2    LA area 1 25 cm2    Height 69.0 in    Weight 138 lbs    LA length 6.3 cm   Basic metabolic panel   Result Value Ref Range    Sodium 135 (L) 136 - 145 mmol/L    Potassium 3.9 3.5 - 5.0 mmol/L    Chloride 105 98 - 107 mmol/L    CO2 25 22 - 31 mmol/L    Anion Gap, Calculation 5 5 - 18 mmol/L    Glucose 99 70 - 125 mg/dL    Calcium 8.4 (L) 8.5 - 10.5 mg/dL    BUN 17 8 - 28 mg/dL    Creatinine 0.72 0.60 - 1.10 mg/dL    GFR MDRD Af Amer >60 >60 mL/min/1.73m2    GFR MDRD Non Af Amer >60 >60 mL/min/1.73m2   Hepatic function panel   Result Value Ref Range    Bilirubin, Total 0.9 0.0 - 1.0 mg/dL    Bilirubin, Direct 0.3 <=0.5 mg/dL    Protein, Total 5.7 (L) 6.0 - 8.0 g/dL    Albumin 2.7 (L) 3.5 - 5.0 g/dL    Alkaline Phosphatase 82 45 - 120 U/L    AST 10 0 - 40 U/L    ALT <6 0 - 45 U/L   Troponin I   Result Value Ref Range    Troponin I 0.01 0.00 - 0.29 ng/mL   HM1 (CBC with Diff)   Result Value Ref Range    WBC 7.8 4.0 - 11.0 thou/uL    RBC 3.82 3.80 - 5.40 mill/uL    Hemoglobin 8.5 (L) 12.0 - 16.0 g/dL    Hematocrit 26.6 (L) 35.0 - 47.0 %    MCV 70 (L) 80 - 100 fL    MCH 22.3 (L) 27.0 - 34.0 pg    MCHC 32.1 32.0 - 36.0 g/dL    RDW 22.4 (H) 11.0 - 14.5 %    Platelets 162 140 - 440 thou/uL    MPV 9.2 7.0 - 10.0 fL    Neutrophils % 71 (H) 50 - 70 %    Lymphocytes % 18 (L) 20 - 40 %    Monocytes %  9 2 - 10 %    Eosinophils % 0 0 - 6 %    Basophils % 2 0 - 2 %    Neutrophils Absolute 5.6 2.0 - 7.7 thou/uL    Lymphocytes Absolute 1.4 0.8 - 4.4 thou/uL    Monocytes Absolute 0.7 0.0 - 0.9 thou/uL    Eosinophils Absolute 0.0 0.0 - 0.4 thou/uL    Basophils Absolute 0.1 0.0 - 0.2 thou/uL   Ferritin   Result Value Ref Range    Ferritin 32 10 - 130 ng/mL   Crossmatch   Result Value Ref Range    Crossmatch Compatible     Blood Expiration Date 20170313235900     Unit Type O Pos     Unit Number [id number]     Status Transfused     Component Red Blood Cells     PRODUCT CODE [id number]     Issue Date and Time [id number]     Blood Type 5100     CODING SYSTEM [id number]    Crossmatch   Result Value Ref Range    Crossmatch Compatible     Blood Expiration Date 79578781552106     Unit Type O Pos     Unit Number [id number]     Status Transfused     Component Red Blood Cells     PRODUCT CODE [id number]     Issue Date and Time [id number]     Blood Type 5100     CODING SYSTEM [id number]    Hemoglobin   Result Value Ref Range    Hemoglobin 9.5 (L) 12.0 - 16.0 g/dL   Platelet Count - every other day x 3   Result Value Ref Range    Platelets 165 140 - 440 thou/uL   Hemoglobin   Result Value Ref Range    Hemoglobin 8.6 (L) 12.0 - 16.0 g/dL         Assessment:  1. Gastric AVM     2. Acute upper GI bleed     3. Symptomatic anemia     4. Melena     5. Constipation     6. Parkinson disease     7. Hypertension         Plan: Clarify her Sinemet dosing to what she's getting it home. For the constipation we will offer produce 6 ounces Q daily. We will add senna S2 BID and hold of loose stool. On Monday we will do a mini mental status exam and if it is greater than 18 proceed with a [place] cognitive assessment. We will do a hemoglobin on Monday as well to determine that she has stabilized and compare this to her previous 9.5.Staff will do a care conference to go over her improvement to determine how close we are to plateauing to where she  was before and then make recommendations on long-term placement.    Total 55 minutes of which 65% was spent in counseling and coordination of care of the above plan.    Electronically signed by: Vignesh Vaz MD

## 2021-06-09 NOTE — PROGRESS NOTES
Code Status:  FULL CODE  Visit Type: Follow Up     Facility:  [facility] [[id number]]        Facility Type: SNF (Skilled Nursing Facility, TCU)    History of Present Illness: Kita Hills is a 84 y.o. female with a hx of Parkinson's disease, hypertension and chronic constipation, admitted to SNF on 3/2 after hospitalization for anemia secondary to AVM s/p cauterization. She had episode of hypotension and dizziness yesterday with BP 94/62. Her Blood pressure medications including 25mg daily HCTZ and 0.3mg clonidine BID. Today dizziness has resolved. Denies any dizziness with going from sitting to standing position. No vertigo. Denies CP, headache. Reports her appetite has been good and she has been staying well hydrated. Her last bowel movement was 3 days ago and was noted to be normal, no straining, blood, melena or diarrhea. She is on Senna-S two tablets BID and prune juice. Urination fine. No peripheral edema. She does endorse feeling tired. Patient is currently at physical therapy during this assessment. Had MMSE 24/30 and MOCA 21/30 on 3/6. Patient denies any confusion, hallucinations.     Review of Systems   Constitutional: Positive for fatigue. Negative for appetite change, chills and fever.   HENT: Negative for congestion.    Respiratory: Negative for cough, shortness of breath and wheezing.    Cardiovascular: Negative for chest pain, palpitations and leg swelling.   Gastrointestinal: Positive for constipation. Negative for abdominal pain, anal bleeding, blood in stool, diarrhea, nausea and vomiting.   Genitourinary: Negative for dysuria, flank pain, hematuria and urgency.   Musculoskeletal: Negative for back pain, gait problem and joint swelling.   Neurological: Positive for light-headedness. Negative for tremors, seizures, syncope, facial asymmetry, speech difficulty, weakness, numbness and headaches.   Psychiatric/Behavioral: Negative for confusion, hallucinations and sleep disturbance.    All other systems reviewed and are negative.     Vitals:    03/08/17 1055   BP: 126/81   Pulse: 78   Resp: 18   Temp: 97.8  F (36.6  C)   SpO2: 97%       Physical Exam   Constitutional: She is oriented to person, place, and time. She appears well-developed and well-nourished. No distress.   HENT:   Head: Normocephalic and atraumatic.   Mouth/Throat: Oropharynx is clear and moist.   Eyes: EOM are normal. Pupils are equal, round, and reactive to light.   Cardiovascular: Normal rate and regular rhythm.    No murmur heard.  Pulmonary/Chest: Effort normal and breath sounds normal.   Abdominal: Soft. She exhibits no mass. There is no tenderness. There is no rebound and no guarding.   Musculoskeletal: Normal range of motion. She exhibits no edema.   Neurological: She is alert and oriented to person, place, and time.   Skin: Skin is warm and dry.   Psychiatric: She has a normal mood and affect.   Nursing note and vitals reviewed.      Labs:  All labs reviewed in the nursing home record.  3/2: HGB 8.6     Assessment:  1. HTN (hypertension), benign     2. Gastric AVM     3. Parkinson disease     4. Constipation       Plan:   Hx of HTN but with low BP yesterday to 94/62, with some symptoms of dizziness. Symptoms resolved today and has received her normal BP medications (HCTZ and clonidine) with stable BP. Possibly related to dehydration vs anemia.  No CP and normal cardiac exam.   -recheck HGB today along with BMP  -Orthostatic BP for two days  -Hold BP medications if under 100 systolic  -Re-evaluted BP medication after a couple days and labs, consider clonidine patch vs different agent.     Gastric AVM s/p cauterization. Last HGB was 8.6 on 3.2. Will recheck today. No symptoms of melena.     Constipation: on senna-S two tablets BID and prune juice. No bowel movement in 3 days. No signs of obstruction and normal abdominal exam. Appetite normal.   -Add mirilax 1 capful daily    25 minutes spent of which greater than 65% was  face to face communication with the patient about above plan of care    Electronically signed by: Vane Morrison MD Examined and reviewed with Dr. Morrison third-year family practice resident and I agree with her assessment and plan.  Staffed with Dr. Татьяна Vaz MD

## 2021-06-09 NOTE — PROGRESS NOTES
Code Status:  FULL CODE  Visit Type: Problem Visit (lo iron,anemia,lo pot.)     Facility:  [facility] [[id number]]        Facility Type: SNF (Skilled Nursing Facility, TCU)    History of Present Illness: Kita Hills is a 84 y.o. female Who had in A/V malformation and some overall global deterioration and because of this came to us for rehab. She's making very good progress in rehabilitation increasing her strength. Unfortunately she's not had a bowel moment in five days. This is apparently not uncommon for her. In addition we garnered lab work. She reports no complaints. Son brought in her medicines from before. She was actually on a slightly lower dose of clonidine and a slightly different dose of carbidopa levodopa. She's not had any untoward tremors at this time.     Review of Systems     Physical Exam   Cardiovascular: Normal rate.    Abdominal: Soft. Bowel sounds are normal.   Neurological:   Patient alert interactive no visual tremors. Can get up and walk independently without shoe shuffling or leaning forward.   Vitals reviewed.      Labs:  All labs reviewed in the nursing home record.    Assessment:  1. Gastric AVM     2. Symptomatic anemia     3. Constipation     4. Hypokalemia         Plan: Verapamil is a common cause of constipation and she is on this we will  from verapamil as I see no allergies to metoprolol tartrate 50 mg BID PO. As her serum iron was noted to be very low we will increase her first soul to 325 mg one BID. As a potassium was additionally low we will add 10 mEq Q daily. We will repeat a CBC with diff with a serum ferritin and a serum potassium in one week.      25 minutes spent of which greater than 65% was face to face communication with the patient about above plan of care    Electronically signed by: Vignesh Vaz MD

## 2021-06-09 NOTE — PROGRESS NOTES
Code Status:  FULL CODE  Visit Type: Problem Visit (htn,constip)     Facility:  [facility] [[id number]]        Facility Type: SNF (Skilled Nursing Facility, TCU)    History of Present Illness: Kita Hills is a 84 y.o. female I was asked to see today because of refusal of clonidine and her blood pressure going high. Also in follow-up on her constipation after changing her blood pressure medicines. Wonderfully she was able to have some regular bowel moments after changing from verapamil to metoprolol. Said no on toward affect. Unfortunately she stated that the clonidine made her tired and she refused it in and her blood pressure flared. She feels that it is more and one side than the other. No current problems at this time.    Review of Systems     Physical Exam   Constitutional: She is oriented to person, place, and time.   Cardiovascular:   Heart rate and slow with a systolic ejection murmur her throughout entire precordial   Pulmonary/Chest: Effort normal and breath sounds normal.   Abdominal: Soft. Bowel sounds are normal.   Musculoskeletal: She exhibits no edema.   Neurological: She is alert and oriented to person, place, and time.   Vitals reviewed.      Labs:  All labs reviewed in the nursing home record.  Results for orders placed or performed in visit on 03/08/17   Basic Metabolic Panel   Result Value Ref Range    Sodium 138 136 - 145 mmol/L    Potassium 3.4 (L) 3.5 - 5.0 mmol/L    Chloride 97 (L) 98 - 107 mmol/L    CO2 33 (H) 22 - 31 mmol/L    Anion Gap, Calculation 8 5 - 18 mmol/L    Glucose 108 70 - 125 mg/dL    Calcium 8.9 8.5 - 10.5 mg/dL    BUN 24 8 - 28 mg/dL    Creatinine 1.13 (H) 0.60 - 1.10 mg/dL    GFR MDRD Af Amer 56 (L) >60 mL/min/1.73m2    GFR MDRD Non Af Amer 46 (L) >60 mL/min/1.73m2           Assessment:  1. HTN (hypertension), benign     2. Constipation     3. Hypokalemia         Plan:   Echo was reviewed which did show right ventricular systolic pressure relative to  write arterial pressure slightly increased. The pulmonary artery pressure is estimated to be 45 to 50 mmHg. This characteristically would not show the discrepancy noted in the side to side blood pressures. We went ahead in repeated right and left-sided upper arm blood pressures systolic's are 111 and 115 respectively. Because of this we will discontinue the clonidine because of the risk of rebound hypertension and the tiredness that she's not enjoying. We will add lisinopril 2.5 mg Q daily. This will give her the added benefit of afterload reduction and we will continue to monitor both right and left-sided pressures. We will continue the constipation regimen as is his would become very successful with the recent changes.      25 minutes spent of which greater than 65% was face to face communication with the patient about above plan of care    Electronically signed by: Vignesh Vaz MD

## 2021-06-09 NOTE — PROGRESS NOTES
Code Status:  FULL CODE  Visit Type: Problem Visit (htn,const)     Facility:  [facility] [[id number]]        Facility Type: SNF (Skilled Nursing Facility, TCU)    History of Present Illness: Kita Hills is a 84 y.o. female I'm seeing back in follow-up in regards to her overall energy dispensation and adjustments to her blood pressure medicines. She states that she is much less fearful now that she's not on the clonidine and has had no on toward affect. There is a note from her neurologist about a new medicine but I don't see it on the laureen. She is in good spirits overall in therapy feels that she's progressing increasing in strength.     Review of Systems     Physical Exam   Constitutional:   Alert interactive, serial blood pressures are all under 145. She is doing the new step and a reaching your plateau in terms of her other activities.   Vitals reviewed.      Labs:  All labs reviewed in the nursing home record.    Assessment:  1. HTN (hypertension), benign         Plan: Will clarify about the startup of the new a Parkinson medicine but for now I will continue with the same blood pressure regimen that we started several days prior as it seems to be doing well with the metoprolol tartrate 50 mg b.i.d. in the lisinopril 2.5 mg Q daily.      15 minutes spent of which greater than 65% was face to face communication with the patient about above plan of care    Electronically signed by: Vignesh Vaz MD

## 2021-06-09 NOTE — PROGRESS NOTES
How have you been doing since we last saw you? any concerns? Hospitalized for low hgb . Been at [facility] since 3/2/17      Current Symptoms : Yes tremors.

## 2021-06-09 NOTE — PROGRESS NOTES
Code Status:  FULL CODE  Visit Type: Problem Visit (hotn,tachy,anemia)     Facility:  [facility] [[id number]]        Facility Type: SNF (Skilled Nursing Facility, TCU)    History of Present Illness: Kita Hills is a 84 y.o. female Who I'm seeing back after a very low blood pressure and we had to hold her lisinopril, metoprolol, hydrochlorothiazide. This successfully helped in raising the blood pressure from a low of 66/46 to her current 144 systolic. During this time she was non-tachycardic. She's not had a bowel moment so laboratory testing for quiet was not available. Ironically she described himself is feeling lightheaded this morning but I'm not so certain of she's just confused. Staff did not have any concerns    Review of Systems     Physical Exam   Cardiovascular:   As stated her heart rate never got greater than upper 80s and systolic did blossom up one 40s and 150s. No episode of falling   Pulmonary/Chest: Effort normal and breath sounds normal.   Abdominal: Soft. Bowel sounds are normal.   Musculoskeletal: She exhibits no edema.   Skin: Skin is warm.   Psychiatric: She has a normal mood and affect. Her behavior is normal.   Vitals reviewed.      Labs:  All labs reviewed in the nursing home record.  Recent Results (from the past 240 hour(s))   Basic Metabolic Panel   Result Value Ref Range    Sodium 139 136 - 145 mmol/L    Potassium 3.7 3.5 - 5.0 mmol/L    Chloride 101 98 - 107 mmol/L    CO2 29 22 - 31 mmol/L    Anion Gap, Calculation 9 5 - 18 mmol/L    Glucose 78 70 - 125 mg/dL    Calcium 9.1 8.5 - 10.5 mg/dL    BUN 21 8 - 28 mg/dL    Creatinine 0.79 0.60 - 1.10 mg/dL    GFR MDRD Af Amer >60 >60 mL/min/1.73m2    GFR MDRD Non Af Amer >60 >60 mL/min/1.73m2   HM2(CBC w/o Differential)   Result Value Ref Range    WBC 6.3 4.0 - 11.0 thou/uL    RBC 3.84 3.80 - 5.40 mill/uL    Hemoglobin 9.3 (L) 12.0 - 16.0 g/dL    Hematocrit 31.2 (L) 35.0 - 47.0 %    MCV 81 80 - 100 fL    MCH 24.2 (L) 27.0 -  34.0 pg    MCHC 29.8 (L) 32.0 - 36.0 g/dL    RDW 25.6 (H) 11.0 - 14.5 %    Platelets 198 140 - 440 thou/uL    MPV 11.0 8.5 - 12.5 fL   Basic Metabolic Panel   Result Value Ref Range    Sodium 137 136 - 145 mmol/L    Potassium 3.7 3.5 - 5.0 mmol/L    Chloride 98 98 - 107 mmol/L    CO2 31 22 - 31 mmol/L    Anion Gap, Calculation 8 5 - 18 mmol/L    Glucose 81 70 - 125 mg/dL    Calcium 9.2 8.5 - 10.5 mg/dL    BUN 21 8 - 28 mg/dL    Creatinine 0.89 0.60 - 1.10 mg/dL    GFR MDRD Af Amer >60 >60 mL/min/1.73m2    GFR MDRD Non Af Amer 60 (L) >60 mL/min/1.73m2   HM2(CBC w/o Differential)   Result Value Ref Range    WBC 5.6 4.0 - 11.0 thou/uL    RBC 3.44 (L) 3.80 - 5.40 mill/uL    Hemoglobin 8.6 (L) 12.0 - 16.0 g/dL    Hematocrit 28.6 (L) 35.0 - 47.0 %    MCV 83 80 - 100 fL    MCH 25.0 (L) 27.0 - 34.0 pg    MCHC 30.1 (L) 32.0 - 36.0 g/dL    RDW 26.4 (H) 11.0 - 14.5 %    Platelets 170 140 - 440 thou/uL    MPV 11.2 8.5 - 12.5 fL   Morphology,Smear Review (MORP)   Result Value Ref Range    Pathology, Smear Review See Separate Pathology Report (!) (none)    WBC 6.3 4.0 - 11.0 thou/uL    RBC 3.70 (L) 3.80 - 5.40 mill/uL    Hemoglobin 9.0 (L) 12.0 - 16.0 g/dL    Hematocrit 30.8 (L) 35.0 - 47.0 %    MCV 83 80 - 100 fL    MCH 24.3 (L) 27.0 - 34.0 pg    MCHC 29.2 (L) 32.0 - 36.0 g/dL    RDW 26.1 (H) 11.0 - 14.5 %    Platelets 188 140 - 440 thou/uL    MPV 11.0 8.5 - 12.5 fL    Neutrophils % 63 50 - 70 %    Lymphocytes % 25 20 - 40 %    Monocytes % 12 (H) 2 - 10 %    Eosinophils % 0 0 - 6 %    Basophils % 1 0 - 2 %    Neutrophils Absolute 3.9 2.0 - 7.7 thou/uL    Lymphocytes Absolute 1.6 0.8 - 4.4 thou/uL    Monocytes Absolute 0.8 0.0 - 0.9 thou/uL    Eosinophils Absolute 0.0 0.0 - 0.4 thou/uL    Basophils Absolute 0.0 0.0 - 0.2 thou/uL   Reticulocytes   Result Value Ref Range    Retic Absolute Count 0.093 0.010 - 0.110 mill/uL   Manual Differential   Result Value Ref Range    Platelet Estimate Normal Normal    Ovalocytes 1+ (!) Negative    Peripheral Blood Smear, Path Review   Result Value Ref Range    Case Report       Peripheral Blood Morphology Report                Case: [id number]                                   Authorizing Provider:  Vignesh Vaz MD        Collected:           03/28/2017 1120              Pathologist:           Juan Johnson MD       Received:            03/28/2017 1309              Specimen:    Peripheral Blood                                                                           Final Diagnosis       PERIPHERAL BLOOD:     - NORMOCHROMIC NORMOCYTIC ANEMIA    - SPHEROCYTES ARE PRESENT    - RARE SCHISTOCYTES ARE IDENTIFIED    - REACTIVE APPEARING LYMPHOCYTES AND MONOCYTES ARE PRESENT    - NEGATIVE FOR ACUTE LEUKEMIA    - UNREMARKABLE PLATELETS    Comment       Normocytic anemia can be caused by multiple etiologies including intrinsic bone marrow disease, renal disease, hepatic disease, endocrine disease, anemia of chronic disease, post-hemorrhagic anemia, and bone marrow infiltration by tumors. Recommend clinical correlation and follow-up.     The presence of rare schistocytes may reflect low-grade intravascular hemolysis. Consider assessment of serum heptoglobin and LDH for additional information. Spherocytes are also identified, a finding that may be observed in the setting of extravascular hemolysis (e.g., in the spleen). If clinically appropriate, a direct antiglobulin test may provide useful information.     A few reactive appearing lymphocytes and monocytes are present, suggestive of a recent inflammatory or infectious process. Suggest clinical follow-up.    Charges CPT: 14594  ICD-10: D64.9          Assessment:  1. Hypotension     2. Anemia     3. Hypokalemia         Plan: I explained to her son that the peripheral smear showed no signs of malignancy and that the reticulocyte being elevated it implies that her but now is trying to make up for the underlying anemia. This is a positive sign. Her concern would  be that the drop in hemoglobin may be due to a another bleed and we will await guaiac testing. Further her blood pressure trending up we will reintroduce the metoprolol tartrate but from 50 mg b.i.d. we will go to 25 BID. We will hold on the hydrochlorothiazide and reintroduce the lisinopril 2.5 Q daily while additionally getting a CBC with diff and a BMP in the morning. Will ask them to do BID blood pressure/heart rates into positions and notifying us if systolic is greater than 160 less than hundred and heart rate greater than 90 or less than 60.I appraised both the sun and the social work and nurse manager of the above plan      35 minutes spent of which greater than 65% was face to face communication with the patient about above plan of care    Electronically signed by: Vignesh Vaz MD

## 2021-06-09 NOTE — PROGRESS NOTES
Code Status:  full  Visit Type: Problem Visit (hotn,anemia)     Facility:  [facility] [[id number]]        Facility Type: SNF (Skilled Nursing Facility, TCU)    History of Present Illness: Kita Hills is a 84 y.o. female I'm seen today about potential discharge. 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 Since being with us her hemoglobin was running on the low side but was stable. In addition we did note that she had some cognitive impairment with a [place] cognitive assessment of 21/30 in a mini mental status exam of 24/30 on March 6. She did well however and was scheduled to go home. We'd made several blood pressure modulations which helped with the constipation and controlling her high heart rate as well as blood pressure. Unfortunately the last several days she's run very low blood pressures. The lowest was yesterday at 66/46. Today's was equally low and she was complaining of being lightheaded. Her hemoglobin was again observed to drop as was her RDW observed to rise once again.She reports when pressed that she is a little lightheaded particularly when she comes to standing position. Denies black tarry stools denies nausea vomiting. Staff says she's eating without difficulties.    Review of Systems     Physical Exam   Cardiovascular:   On repeat continued low systolic's all under 100. Heart rate remaining in the 60s and 70s today yesterday was up into the 90s.   Pulmonary/Chest: Effort normal and breath sounds normal.   Abdominal:   No midepigastric tenderness abdomen soft nontender bowel sounds are positive   Vitals reviewed.      Labs:  All labs reviewed in the nursing home record.    Recent Results (from the past 240 hour(s))   Basic Metabolic Panel   Result Value Ref Range    Sodium 139 136 - 145 mmol/L    Potassium 3.7 3.5 - 5.0 mmol/L    Chloride 101 98 - 107 mmol/L    CO2 29 22 - 31 mmol/L     Anion Gap, Calculation 9 5 - 18 mmol/L    Glucose 78 70 - 125 mg/dL    Calcium 9.1 8.5 - 10.5 mg/dL    BUN 21 8 - 28 mg/dL    Creatinine 0.79 0.60 - 1.10 mg/dL    GFR MDRD Af Amer >60 >60 mL/min/1.73m2    GFR MDRD Non Af Amer >60 >60 mL/min/1.73m2   HM2(CBC w/o Differential)   Result Value Ref Range    WBC 6.3 4.0 - 11.0 thou/uL    RBC 3.84 3.80 - 5.40 mill/uL    Hemoglobin 9.3 (L) 12.0 - 16.0 g/dL    Hematocrit 31.2 (L) 35.0 - 47.0 %    MCV 81 80 - 100 fL    MCH 24.2 (L) 27.0 - 34.0 pg    MCHC 29.8 (L) 32.0 - 36.0 g/dL    RDW 25.6 (H) 11.0 - 14.5 %    Platelets 198 140 - 440 thou/uL    MPV 11.0 8.5 - 12.5 fL   Basic Metabolic Panel   Result Value Ref Range    Sodium 137 136 - 145 mmol/L    Potassium 3.7 3.5 - 5.0 mmol/L    Chloride 98 98 - 107 mmol/L    CO2 31 22 - 31 mmol/L    Anion Gap, Calculation 8 5 - 18 mmol/L    Glucose 81 70 - 125 mg/dL    Calcium 9.2 8.5 - 10.5 mg/dL    BUN 21 8 - 28 mg/dL    Creatinine 0.89 0.60 - 1.10 mg/dL    GFR MDRD Af Amer >60 >60 mL/min/1.73m2    GFR MDRD Non Af Amer 60 (L) >60 mL/min/1.73m2   HM2(CBC w/o Differential)   Result Value Ref Range    WBC 5.6 4.0 - 11.0 thou/uL    RBC 3.44 (L) 3.80 - 5.40 mill/uL    Hemoglobin 8.6 (L) 12.0 - 16.0 g/dL    Hematocrit 28.6 (L) 35.0 - 47.0 %    MCV 83 80 - 100 fL    MCH 25.0 (L) 27.0 - 34.0 pg    MCHC 30.1 (L) 32.0 - 36.0 g/dL    RDW 26.4 (H) 11.0 - 14.5 %    Platelets 170 140 - 440 thou/uL    MPV 11.2 8.5 - 12.5 fL         Assessment:  1. Hypotension     2. Anemia     3. Parkinson disease     4. MCI (mild cognitive impairment)         Plan: Because of the down training of the hemoglobin and the up trending up the RDW we are going to order a reticulocyte count, peripheral smear, and why guiac times two. I've called her son and he is in agreement that we will continue to observe her. I'm going to hold her metoprolol, lisinopril, and hydrochlorothiazide. We will repeat her blood pressure and heart rate into positions this afternoon and make  decisions on what medicines will be worthy of reinstituting at that time. We will also repeat the CBC with diff in the morning      35 minutes spent of which greater than 65% was face to face communication with the patient about above plan of care    Electronically signed by: Vignesh Vaz MD

## 2021-06-09 NOTE — PROGRESS NOTES
Code Status:  FULL CODE  Visit Type: Discharge Summary     Facility:  [facility] [[id number]]          PCP:  Reg Smith MD  [phone]       Admission Date to our Facility: March 2, 2017 from [facility]  Discharge Date from our Facility: March 31, 2017 to home    Discharge Diagnosis:    1. Parkinson disease     2. HTN (hypertension), benign     3. Gastric AVM     4. Dementia     5. Constipation     6. Tachycardia     7. Anemia          History of Present Illness: Kita Hills is a 84 y.o. female     Skilled Nursing Facility Course:   who has underlying Parkinson's disease and cognitive impairment as well as hypertension and chronic constipation had an emergency department visit with a discovered a gastric A/V malformation and she was treated with both transfusions and cauterization. She came to us because of deconditioning and decline.    Facility course; we were able to clarify her Parkinson meds with her son because initially there was a small disconnect. She did have constipation apparently she suffered from this all of her life and because of that we introduce. Prune juice as well as the Senna to twice-daily. We did at the time because of cognition concerns get a mini mental status exam which scored 24/30 and he asked for repeat [place] cognitive assessment which scored 21/30 suggestive of mild to moderate dementia. We continue to follow her hemoglobin and on the eighth she did have some low blood pressures and because of this we held her hydrochlorothiazide in clonidine. We added MiraLAX as well.We discontinue the verapamil because of the ongoing constipation in knowing that she had rate elevated hypertension we placed her on metoprolol tartrate 50 mg b.i.d. of. We also further added lactulose one twice a day till she had a BM. On the 14th as her blood pressure went down we discontinued the clonidine in added lisinopril 2.5 mg Q daily. On the 28th she had had some very  low blood pressures and in addition her hemoglobin had dropped to 8.5. We had watched her RDW also rise. Because of this I did a peripheral smear guaiac's times two and a reticulocyte count. The reticulocyte count showed normal to high which is appropriate in the peripheral smear showed no evidence of malignant cells. On the 29th we were able to reintroduce the metoprolol at a lower dose 25 mg BID but we continue to hold the hydrochlorothiazide. We reintroduce the lisinopril a 2.5 Q daily in continued monitoring. Her heart rate did go down in the blood pressure did also go down but it went down a little bit low into the hundred range. During the 29th her son had presented a new medicine that is approved for Parkinson's disease with psychosis called Nuplazid. We conferred with neurology and we elected to start this but. Prior to that we did a baseline EKG. I was concerned about QT interval and having a baseline for comparison. Medicine is a standard dose 17 mg 2Q daily and we started that thereafter with no appreciable side effects. Her EKG showed a normal sinus rhythm with a QT interval of 396 any Q TC of 450. These are slightly elevated hence the value of the baseline. At this time she is ready for discharge with no acute complaints.    Discharge Medications:  Because of the low blood pressure we will discontinue the lisinopril. Carbidopa/levodopa -200 one at HS, one in a.m., carbidopa/levodopa 25 102 tablets BID. Vitamin D 5000 units Q daily. Ferrous sulfate 325 BID with consideration of discontinuation after four weeks. Metoprolol 25 mg BID, MiraLAX 17 g 1Q daily, mirtazapine 45 mg at HS, nuplacid 17 mg 2Q daily, Prilosec 20 mg b.i.d., senna S1Q day PRN, Effexor 37.5 mg 1Q day.    Discharge Plan:  Will get PT, OT, social work via [facility] will request daily BPs and heart rates notify primary care if systolic is greater than 165 less than 100, heart rate less than 60 greater than 100. Follow up with  neurology in two weeks. Follow up with Dr. Smith primary care two weeks. Doing EKG two weeks ideally prior to visitation with neurology to compare QT intervals    Review of Systems     Physical Exam   Constitutional: She is oriented to person, place, and time.   Cardiovascular: Normal rate.    Abdominal: Soft. Bowel sounds are normal.   Neurological: She is alert and oriented to person, place, and time.   Skin: Skin is warm and dry.       Labs:  All labs reviewed in the nursing home record.    Lab Results   Component Value Date    WBC 5.3 03/30/2017    WBC 6.3 03/28/2017    WBC 5.6 03/27/2017    WBC 6.3 03/20/2017    WBC 7.2 03/16/2017    WBC 7.8 02/27/2017    RBC 3.68 (L) 03/30/2017    RBC 3.70 (L) 03/28/2017    RBC 3.44 (L) 03/27/2017    RBC 3.84 03/20/2017    RBC 4.00 03/16/2017    RBC 3.82 02/27/2017    HGB 9.2 (L) 03/30/2017    HGB 9.0 (L) 03/28/2017    HGB 8.6 (L) 03/27/2017    HGB 9.3 (L) 03/20/2017    HGB 9.1 (L) 03/16/2017    HGB 8.6 (L) 03/08/2017    HCT 31.1 (L) 03/30/2017    HCT 30.8 (L) 03/28/2017    HCT 28.6 (L) 03/27/2017    HCT 31.2 (L) 03/20/2017    HCT 32.5 (L) 03/16/2017    HCT 26.6 (L) 02/27/2017    MCV 85 03/30/2017    MCV 83 03/28/2017    MCV 83 03/27/2017    MCV 81 03/20/2017    MCV 81 03/16/2017    MCV 70 (L) 02/27/2017    MCH 25.0 (L) 03/30/2017    MCH 24.3 (L) 03/28/2017    MCH 25.0 (L) 03/27/2017    MCH 24.2 (L) 03/20/2017    MCH 22.8 (L) 03/16/2017    MCH 22.3 (L) 02/27/2017    MCHC 29.6 (L) 03/30/2017    MCHC 29.2 (L) 03/28/2017    MCHC 30.1 (L) 03/27/2017    MCHC 29.8 (L) 03/20/2017    MCHC 28.0 (L) 03/16/2017    MCHC 32.1 02/27/2017    RDW 26.0 (H) 03/30/2017    RDW 26.1 (H) 03/28/2017    RDW 26.4 (H) 03/27/2017    RDW 25.6 (H) 03/20/2017    RDW 24.4 (H) 03/16/2017    RDW 22.4 (H) 02/27/2017     03/30/2017     03/28/2017     03/27/2017     03/20/2017     03/16/2017     03/01/2017    MPV 11.2 03/30/2017    MPV 11.0 03/28/2017    MPV 11.2  03/27/2017    MPV 11.0 03/20/2017    MPV 11.6 03/16/2017    MPV 9.2 02/27/2017     Lab Results   Component Value Date    IRON 75 03/16/2017    FERRITIN 32 02/27/2017         Assessment:  1. Parkinson disease     2. HTN (hypertension), benign     3. Gastric AVM     4. Dementia     5. Constipation     6. Tachycardia     7. Anemia           DISCHARGE PLAN/FACE TO FACE:  I certify that this patient is under my care and that I, or a nurse practitioner or physician's assistant working with me, had a face-to-face encounter that meets the physician face-to-face encounter requirements with this patient.   Date of Face-to-Face Encounter: March 31, 2017    I certify that, based on my findings, the following services are medically necessary home health services: OT, PT, social work        45 minutes total time of which 65% was in face to face communication with patient about above plan of care.    Electronically signed by: Vignesh Vaz MD

## 2021-06-10 NOTE — PROGRESS NOTES
"Assessment:     1. Parkinson's Disease  2. Depression  3. History of low Hgb  4. Generalized weakness    Plan:     PD: no changes in medications  Therapies: continue with home health  Exercise- encouraged  REM Sleep Disorder -start Nuplazid   Falls - monitor B/P, compression stocking encourage fluids  Anxiety/Depressed- start Nuplazid, continue with Remeron and Venlafaxine  Next Appointment - with me in 3 weeks    The patient returns to the [facility] for a follow up visit, the patient was last seen by me on 3/15/17, where I was going to start the patient on Nuplazid. The patient has had a rapid decline since the last appointment. At last appointment the patient was staying in a transitional care unit after a hospitalization for low hemoglobin and gastrointestinal bleeding.  She was about to be discharged home on 3/31/17 and the next day home care staff came to evaluate the patient and found that the patient had an episode where she appeared weak, pale color to her skin, decreased blood pressure, so she was transferred to the ER. She has been very weak since, her B/P meds have been readjusted, I have also ordered compression stockings and a wheelchair for the patient. I don't believe patient actually started the Nuplazid, which could be due to the multiple moves since I last saw her. Patient reports not sleeping and \"being exhausted\" I will have my RN check into this.    Subjective:        PD summary: Dr. Black initially saw Mrs. Hills in 8/2013. She is a right handed white female who started to have right side tremor about in about 2010. He was initially diagnosed with Parkinson disease by her neurologist at [facility]. She has been placed on carbidopa, levodopa. She was increased to 50/200 extended release 3 times a day but she felt lightheaded and dizzy, subsequently she decreased to 1 pill in the morning, half tablets in the early afternoon. Hand writing was getting smaller. She did " feel the medication was helping her left-sided tremor. She has some mild balance issue but it was multifactorial. She has been seen at [facility], she was not satisfied with their care, came to this clinic after contacting [facility] and after looking up all the doctors names for PD. Last visit with Marielos was about summer of 2015. Was not been able to walk Sept 2015, due to weakness. Has therapies without benefit (PD therapies.). She took her self off Sinemet 25/250 TID thanksgiving of 2015. Still took Sinemet CR 50/200 HS. She cannot push up to get out of chair, speech softer, Can walk only cross the room, about 4.5 min for 25 feet. Very slow. Right leg drags more. No tremor, feels SOB. No known lung and heart conditions. She is trying inspirators. She feels in general, she is a little clumsy feeding herself, but no particularly dysphagia. Symptoms worsen after the d/c of the sinemet.     She was very active and exercise regularly., patient is currently very weak and is no longer able to ambulate, b/p drops significantly when standing. She lives with her son who helps with things around the house. She does have a cane, normally walks with that. She is hard of hearing and wears hearing aids. She did lost right side hearing aid, but is wearing the left side. She has a hysterectomy, bilateral hip surgery x4, knee surgery in 2006, had cholecystectomy and left kidney stones. She has a history of hypertension, osteoporosis, asthma, monoclonal gammopathy of unknown significance, knee surgery, glaucoma, hypernephroma surgically resected on the left side.     PD: Sinemet 25/100, 2 tabs BID noon and 5:30 pm. Taking 50/200 CR 1 tab BID at 8:30 am, and 10 pm. NO side effects. NO clear wearing off  RLS- . denies  ADL -  Is assist of one with ADL, she is no longer able to ambulate and she is very week  Driving- does not drive   Exercise - presently has therapies coming to her house  Falls - is suddenly W/C bound   History of  low Hgb- was in the hospital for 6 days Hgb was 6.0, is being monitored by primary  Medication Side Effects - denies any side effects  Memory/Mild cognitive impairment -feeling ok. No changes. MOCA 25/30 in 5/2016  Anxiety/Depression - patient is very tired, is currently on Remeron and Venlafaxine, will see if sleeping helps mood  REM sleep disorder: complains of vivid dreams, daytime sleepiness, her sleep is poor, she never feels rested,  start Nuplazid per RN prior auth was never completed  Dyskinesia- none  Orthostatics: has been having severe ortho stasis, b/p meds were just adjusted   Advanced Directives - completed  Surgery - dementia level of impairment not a good candidate  Drooling - denies problem    Patient Active Problem List    Diagnosis Date Noted     Constipation 03/08/2017     Symptomatic anemia 02/27/2017     Fall 02/27/2017     SOB (shortness of breath) 02/27/2017     Gastric AVM 02/27/2017     Melena 02/27/2017     Parkinson disease 05/18/2016     Arthritis 05/18/2016     Memory difficulty 05/18/2016     Drooling 05/18/2016     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     HTN (hypertension), benign 05/18/2016     [place] (hard of hearing), bilateral 05/18/2016     Tremor 08/02/2014     Past Medical History:   Diagnosis Date     Anemia      Asthma      Glaucoma      HTN (hypertension)      OP (osteoporosis)      Parkinson disease      Vitamin D deficiency      Word finding difficulty 2016     Past Surgical History:   Procedure Laterality Date     ESOPHAGOGASTRODUODENOSCOPY N/A 2/27/2017    Procedure: ESOPHAGOGASTRODUODENOSCOPY (EGD) with APC cautery usage;  Surgeon: Kelton Sr MD;  Location: [facility];  Service:      HIP ARTHROPLASTY Right      HIP SURGERY Left      HYSTERECTOMY       KNEE SURGERY Right      NEPHRECTOMY       Family History   Problem Relation Age of Onset     Parkinsonism Paternal Uncle      Heart disease Mother      Heart attack Father      Pneumonia Brother      Heart murmur  Daughter      Diabetes Son      No Medical Problems Son      Current Outpatient Prescriptions   Medication Sig Dispense Refill     carbidopa-levodopa (SINEMET CR)  mg per tablet Take 0.5-1 tablets by mouth 2 (two) times a day. Take 1 tab every am and take 0.5 tab at 1500        carbidopa-levodopa (SINEMET)  mg per tablet Take 2 tablets by mouth 2 (two) times a day. Take around noon and 6pm       cholecalciferol, vitamin D3, 5,000 unit Tab Take 1 tablet by mouth daily.       ferrous sulfate 325 (65 FE) MG tablet Take 1 tablet by mouth daily with breakfast.       metoprolol tartrate (LOPRESSOR) 25 MG tablet Take 25 mg by mouth 2 (two) times a day. Indications: hypertension       mirtazapine (REMERON) 45 MG tablet Take 45 mg by mouth bedtime.       omeprazole (PRILOSEC) 20 MG capsule Take 1 capsule (20 mg total) by mouth 2 (two) times a day before meals. 60 capsule 0     pimavanserin (NUPLAZID) 17 mg Tab tablet Take 2 tablets (34 mg total) by mouth daily. 60 tablet 6     venlafaxine (EFFEXOR XR) 37.5 MG 24 hr capsule Take 1 capsule (37.5 mg total) by mouth daily. 30 capsule 3     No current facility-administered medications for this encounter.        Allergies   Allergen Reactions     Crestor [Rosuvastatin]      Paroxetine      Penicillins      Pramipexole      Statins-Hmg-Coa Reductase Inhibitors      Zetia [Ezetimibe]      Social History     Social History     Marital status:      Spouse name: N/A     Number of children: N/A     Years of education: N/A     Occupational History     Not on file.     Social History Main Topics     Smoking status: Never Smoker     Smokeless tobacco: Never Used     Alcohol use No     Drug use: No     Sexual activity: No     Other Topics Concern     Not on file     Social History Narrative    Her son lives with her in a single family home       The following portions of the patient's history were reviewed and updated as appropriate: allergies, current medications, past  family history, past medical history, past social history, past surgical history and problem list.    Review of Systems  A comprehensive review of systems was negative except for: what was noted above      Objective:     Vitals:    04/12/17 1315   BP: 135/62   Pulse: 60     I asked her to call with any questions or concerns and will see her again in clinic in about 3 weeks . We discussed the risks and benefits of the medication including risk of worsening depression with medication adjustments and even the possibility of emergence of suicidally      Total time spent with the patient today was 45 minutes with greater than 50% of the time spent in counseling and care coordination.       She is a very thin and frail looking, but looking better. Left leg is about 1 to 2 inches shorter than the right. Right ankle with brace. She has normal mental status, language and but a little soft speech. Cranial nerves 2 through 12 significant for hard of hearing (She did lost right side hearing aid, but is wearing the left side) and wearing glasses, otherwise unremarkable. She is sitting in chair comfortable, in no acute distress. Language normal. She has decreased muscle bulk in right legs and complete foot drop on the right side with flaccid tone around the ankle. Some numb feeling in right leg. Absent deep tendon reflexes in bilateral ankles, diminished in the knees bilaterally.  No Babinski sign. No tremors, no rigidities in upper extremities. Hand coordination seemed to be poor, worse on right, decreased amplitudes. Some ulnar deviation of fingers and contractions in hands, worse on right. She does have more spontaneous movement in the left side upper extremity, compared to the right side. Gait and station deferred. There is no leg swelling at this point. She is atraumatic, normocephalic. Some drooling.

## 2021-06-10 NOTE — PROGRESS NOTES
How have you been doing since we last saw you? any concerns? Questions regarding medication carbidopa-levodopa, pt seems to be more tired, trouble with speech, muscle coordination, blood pressure sometimes out of range depending on her energy.       Current Symptoms : Yes

## 2021-06-10 NOTE — PROGRESS NOTES
Assessment:     1. Parkinson's Disease  2. Depression  3. History of low Hgb  4. Generalized weakness    Plan:     PD: no changes in medications  Therapies: continue with home health  Exercise- encouraged  REM Sleep Disorder -start Nuplazid   Falls - monitor B/P, compression stocking encourage fluids  Anxiety/Depressed- start Nuplazid, continue with Remeron and Venlafaxine  Next Appointment - with me in 3 weeks    The patient returns to the [facility] for a follow up visit, the patient was last seen by me on 4/12/17, where I started the patient on Nuplazid. The patient was again hospitalized for low B/P. While in the ER the neurologist discontinued the Nuplazid because the medication has a risk factor of causing low B/P. The patient was having B/P problems before starting the Nuplazid. They did readjust her b/p medication again. She presents her today reporting that she is very weak, patient does have therapies coming to her home but he son reports they are not doing that much. I would like for the patient to come here for therapies, the patient was walking a month ago. I would like to get the patient back to her baseline. I will also split her HS dose of Sinemet CR, now she will take Sinemet CR 50/200 0.5 tab BID at 0800 and 2200, we will see if this helps her stiffness during the day. I will do more adjustments with depression and parkinson's medication after we are sure b/p is stable.    Subjective:        PD summary: Dr. Black initially saw Mrs. Hills in 8/2013. She is a right handed white female who started to have right side tremor about in about 2010. He was initially diagnosed with Parkinson disease by her neurologist at [facility]. She has been placed on carbidopa, levodopa. She was increased to 50/200 extended release 3 times a day but she felt lightheaded and dizzy, subsequently she decreased to 1 pill in the morning, half tablets in the early afternoon. Hand writing was  getting smaller. She did feel the medication was helping her left-sided tremor. She has some mild balance issue but it was multifactorial. She has been seen at [facility], she was not satisfied with their care, came to this clinic after contacting [facility] and after looking up all the doctors names for PD. Last visit with Marielos was about summer of 2015. Was not been able to walk Sept 2015, due to weakness. Has therapies without benefit (PD therapies.). She took her self off Sinemet 25/250 TID thanksgiving of 2015. Still took Sinemet CR 50/200 HS. She cannot push up to get out of chair, speech softer, Can walk only cross the room, about 4.5 min for 25 feet. Very slow. Right leg drags more. No tremor, feels SOB. No known lung and heart conditions. She is trying inspirators. She feels in general, she is a little clumsy feeding herself, but no particularly dysphagia. Symptoms worsen after the d/c of the sinemet.     3/15/17 [place] she is very active and exercise regularly., patient is currently very weak and is no longer able to ambulate, b/p drops significantly when standing. She lives with her son who helps with things around the house. She does have a cane, normally walks with that. She is hard of hearing and wears hearing aids. She did lost right side hearing aid, but is wearing the left side. She has a hysterectomy, bilateral hip surgery x4, knee surgery in 2006, had cholecystectomy and left kidney stones. She has a history of hypertension, osteoporosis, asthma, monoclonal gammopathy of unknown significance, knee surgery, glaucoma, hypernephroma surgically resected on the left side.  4/12/17  The patient has had a rapid decline since the last appointment. At last appointment the patient was staying in a transitional care unit after a hospitalization for low hemoglobin and gastrointestinal bleeding.  She was about to be discharged home on 3/31/17 and the next day home care staff came to evaluate the patient  "and found that the patient had an episode where she appeared weak, pale color to her skin, decreased blood pressure, so she was transferred to the ER. She has been very weak since, her B/P meds have been readjusted, I have also ordered compression stockings and a wheelchair for the patient. I don't believe patient actually started the Nuplazid, which could be due to the multiple moves since I last saw her. Patient reports not sleeping and \"being exhausted\" I will have my RN check into this. Sinemet 25/100, 2 tabs BID noon and 1800, Sinemet  50/200 CR 1 tab at 10 pm    PD: Sinemet 25/100, 2 tabs BID noon and 1800, Sinemet  50/200 CR 1 tab at 10 pm. No side effects. Patient is stiff in the am so I will change Sinemet CR 50/200 to 0.5 tab BID at 0800 and 2200  RLS- . denies  ADL -  Is assist of one with ADL, she is no longer able to ambulate and she is very week. Patient is currently living with son and nurse and aid come to house. Son does assist with ADLs  Driving- does not drive   Exercise - presently has therapies coming to her house, but will switch to therapies here  Falls - is suddenly W/C bound, she will be starting parkinson therapies here at [place]  History of low Hgb- was in the hospital for 6 days Hgb was 6.0, is being monitored by primary  Medication Side Effects - denies any side effects  Memory/Mild cognitive impairment -feeling ok. No changes. MOCA 25/30 in 5/2016  Anxiety/Depression - patient is very tired, is currently on Remeron and Venlafaxine, will discuss at next appointment  REM sleep disorder: complains of vivid dreams, daytime sleepiness, her sleep is poor, she never feels rested, started Nuplazid but it was discontinued d/t low b/p, continue on Remeron  Dyskinesia- none  Orthostatics: has been having severe ortho stasis, b/p meds were just adjusted, was admitted to the ER on 4/21/17 for low B/P, Nuplazid was d/c, and B/P meds again adjusted  Advanced Directives - completed  Surgery - " dementia level of impairment not a good candidate  Drooling - denies problem    Patient Active Problem List    Diagnosis Date Noted     Orthostatic hypotension 04/21/2017     Dementia without behavioral disturbance, unspecified dementia type      Constipation 03/08/2017     Symptomatic anemia 02/27/2017     Fall 02/27/2017     SOB (shortness of breath) 02/27/2017     Gastric AVM 02/27/2017     Melena 02/27/2017     Parkinson disease 05/18/2016     Arthritis 05/18/2016     Memory difficulty 05/18/2016     Drooling 05/18/2016     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     HTN (hypertension), benign 05/18/2016     [place] (hard of hearing), bilateral 05/18/2016     Tremor 08/02/2014     Past Medical History:   Diagnosis Date     Anemia      Asthma      Glaucoma      HTN (hypertension)      OP (osteoporosis)      Parkinson disease      Vitamin D deficiency      Word finding difficulty 2016     Past Surgical History:   Procedure Laterality Date     ESOPHAGOGASTRODUODENOSCOPY N/A 2/27/2017    Procedure: ESOPHAGOGASTRODUODENOSCOPY (EGD) with APC cautery usage;  Surgeon: Kelton Sr MD;  Location: [facility];  Service:      HIP ARTHROPLASTY Right      HIP SURGERY Left      HYSTERECTOMY       KNEE SURGERY Right      NEPHRECTOMY       Family History   Problem Relation Age of Onset     Parkinsonism Paternal Uncle      Heart disease Mother      Heart attack Father      Pneumonia Brother      Heart murmur Daughter      Diabetes Son      No Medical Problems Son      Current Outpatient Prescriptions   Medication Sig Dispense Refill     calcium, as carbonate, (TUMS) 200 mg calcium (500 mg) chewable tablet Chew 1-2 tablets 3 (three) times a day as needed for heartburn.       carbidopa-levodopa (SINEMET CR)  mg per tablet Take 0.5 tablets by mouth 2 (two) times a day.        carbidopa-levodopa (SINEMET)  mg per tablet Take 2 tablets by mouth 2 (two) times a day. Take around noon and 6pm       cholecalciferol, vitamin  D3, 5,000 unit Tab Take 1 tablet by mouth daily.       ferrous sulfate 325 (65 FE) MG tablet Take 1 tablet by mouth daily with breakfast.       lisinopril (PRINIVIL,ZESTRIL) 5 MG tablet Take 1 tablet (5 mg total) by mouth daily. (Patient taking differently: Take 2.5 mg by mouth daily. Changed per Dr Smith on 4/28/17 per son Dk.   Indications: hypertension) 30 tablet 0     menthol-zinc oxide (CALMOSEPTINE) 0.44-20.6 % Oint ointment Apply 1 application topically 2 (two) times a day.       mirtazapine (REMERON) 45 MG tablet Take 45 mg by mouth bedtime.       omeprazole (PRILOSEC) 20 MG capsule Take 1 capsule (20 mg total) by mouth 2 (two) times a day before meals. 60 capsule 0     polyvinyl alcohol (LIQUIFILM TEARS) 1.4 % ophthalmic solution Apply 1 drop to eye as needed for dry eyes.       senna-docusate (SENNOSIDES-DOCUSATE SODIUM) 8.6-50 mg tablet Take 1 tablet by mouth 2 (two) times a day as needed for constipation.       venlafaxine (EFFEXOR) 37.5 MG tablet Take 37.5 mg by mouth every morning.       No current facility-administered medications for this encounter.        Allergies   Allergen Reactions     Crestor [Rosuvastatin]      Paroxetine      Penicillins      Pramipexole      Statins-Hmg-Coa Reductase Inhibitors      Zetia [Ezetimibe]      Social History     Social History     Marital status:      Spouse name: N/A     Number of children: N/A     Years of education: N/A     Occupational History     Not on file.     Social History Main Topics     Smoking status: Never Smoker     Smokeless tobacco: Never Used     Alcohol use No     Drug use: No     Sexual activity: No     Other Topics Concern     Not on file     Social History Narrative    Her son lives with her in a single family home     The following portions of the patient's history were reviewed and updated as appropriate: allergies, current medications, past family history, past medical history, past social history, past surgical history and  problem list.    Review of Systems  A comprehensive review of systems was negative except for: what was noted above      Objective:     Vitals:    05/05/17 1320   BP: 116/58   Pulse: 84     I asked her to call with any questions or concerns and will see her again in clinic in about 4 weeks . We discussed the risks and benefits of the medication including risk of worsening depression with medication adjustments and even the possibility of emergence of suicidally      Total time spent with the patient today was 45 minutes with greater than 50% of the time spent in counseling and care coordination.       She is a very thin and frail looking, but looking better. Left leg is about 1 to 2 inches shorter than the right. Right ankle with brace. She has normal mental status, language and but a little soft speech. Cranial nerves 2 through 12 significant for hard of hearing (She did lost right side hearing aid, but is wearing the left side) and wearing glasses, otherwise unremarkable. She is sitting in chair comfortable, in no acute distress. Language normal. She has decreased muscle bulk in right legs and complete foot drop on the right side with flaccid tone around the ankle. Some numb feeling in right leg. Absent deep tendon reflexes in bilateral ankles, diminished in the knees bilaterally.  No Babinski sign. No tremors, no rigidities in upper extremities. Hand coordination seemed to be poor, worse on right, decreased amplitudes. Some ulnar deviation of fingers and contractions in hands, worse on right. She does have more spontaneous movement in the left side upper extremity, compared to the right side. Gait and station deferred. There is no leg swelling at this point. She is atraumatic, normocephalic. Some drooling.

## 2021-06-11 NOTE — PROGRESS NOTES
[facility] Care Office Consult     Assessment:     1. HTN (hypertension), benign -past history of hypertension and now off all antihypertensives.  Agree with this.     2. COOK (dyspnea on exertion) -suspect could be some coronary artery disease and discussed pursuing pharmacological stress testing.  Patient declines but will talk with family.     3. Orthostatic hypotension -suspect due to autonomic dysfunction from parkinsonism.  Agree with compression stockings is doing as well as sports drinks to help hydrate.  Could consider adding Midrin but will want stress testing beforehand to make sure there is no significant coronary artery disease.     4. Symptomatic anemia -this is due to AVMs and defer to primary and GI about how long to continue proton pump inhibitor as well as iron.     5.  Mild pulmonary hypertension-most likely due to hypoventilation from parkinsonism.     Plan:   1.  Recommend stress testing which patient declines.  2.  Sports drinks and well hydration.    History of Present Illness:   Thank you for asking the [facility] Heart Care team to see [place] a 84 y.o. female  in consultation  to evaluate low blood pressure and fast heartbeat.   Patient has been in the usual  state of health with diminishing energy and worsening tremors.  This interferes with her doing much at home but there is no significant chest discomfort, palpitations, PND, orthopnea or peripheral edema.  She does have some shortness of breath and very heavy activity without any snoring at night.    Past Medical History:     Past Medical History:   Diagnosis Date     Anemia      Asthma      Glaucoma      HTN (hypertension)      OP (osteoporosis)      Parkinson disease      Vitamin D deficiency      Word finding difficulty  Renal mass status post nephrectomy  2016     Past history is negative for cancer, tuberculosis, diabetes mellitus, myocardial infarction,  rheumatic fever, cerebrovascular accident, chronic kidney  disease, peptic ulcer disease, chronic obstructive pulmonary disease, or thyroid disorder  and no lipid disorder.    Past Surgical History:     Past Surgical History:   Procedure Laterality Date     ESOPHAGOGASTRODUODENOSCOPY N/A 2/27/2017    Procedure: ESOPHAGOGASTRODUODENOSCOPY (EGD) with APC cautery usage;  Surgeon: Kelton Sr MD;  Location: [facility];  Service:      HIP ARTHROPLASTY Right      HIP SURGERY Left      HYSTERECTOMY       KNEE SURGERY Right      NEPHRECTOMY       Family History:   Family history positive some subtle heart problem in her mother causing her death at the age of 72.    Social History:   She is , lives independently with her son, reports that she has never smoked. She has never used smokeless tobacco. She reports that she does not drink alcohol or use illicit drugs. The primary care physician is Reg Smith MD    Meds:   Scheduled Meds:  Current Outpatient Prescriptions   Medication Sig Dispense Refill     carbidopa-levodopa (SINEMET CR)  mg per tablet Take 0.5 tablets by mouth 2 (two) times a day.        carbidopa-levodopa (SINEMET)  mg per tablet Take 2 tablets by mouth 2 (two) times a day. Take around noon and 6pm       cholecalciferol, vitamin D3, 5,000 unit Tab Take 1 tablet by mouth daily.       ferrous sulfate 325 (65 FE) MG tablet Take 1 tablet by mouth daily with breakfast.       menthol-zinc oxide (CALMOSEPTINE) 0.44-20.6 % Oint ointment Apply 1 application topically 2 (two) times a day.       mirtazapine (REMERON) 45 MG tablet Take 45 mg by mouth bedtime.       omeprazole (PRILOSEC) 20 MG capsule Take 1 capsule (20 mg total) by mouth 2 (two) times a day before meals. 60 capsule 0     polyvinyl alcohol (LIQUIFILM TEARS) 1.4 % ophthalmic solution Apply 1 drop to eye as needed for dry eyes.       senna-docusate (SENNOSIDES-DOCUSATE SODIUM) 8.6-50 mg tablet Take 1 tablet by mouth 2 (two) times a day as needed for constipation.       venlafaxine  (EFFEXOR) 37.5 MG tablet Take 37.5 mg by mouth every morning.       No current facility-administered medications for this visit.        PRN Meds:.    Allergies:   Crestor [rosuvastatin]; Paroxetine; Penicillins; Pramipexole; Statins-hmg-coa reductase inhibitors; and Zetia [ezetimibe]    Objective:      Physical Exam        There is no height or weight on file to calculate BMI.  BP 92/62 (Patient Site: Left Arm, Patient Position: Sitting, Cuff Size: Adult Regular)  Pulse 70  Resp 18    General Appearance:   Alert, cooperative and in no acute distress.   HEENT:  No scleral icterus; the mucous membranes were pink and moist.   Neck: JVP normal. No thyromegaly. No HJR   Chest: The spine was straight. The chest was symmetric.   Lungs:   Respirations unlabored; the lungs are clear to auscultation.   Cardiovascular:   S1 and S2 without murmur, clicks or rubs. Brachial, radial, carotid and posterior tibial pulses are intact and symetrical.  No carotid bruits noted   Abdomen:  No organomegaly, masses, bruits, or tenderness. Bowels sounds are present   Extremities: No cyanosis, clubbing, or edema.   Skin: No xanthelasma.   Neurologic: Mood and affect are appropriate.         Lab Reviewed Personally by myself  Lab Results   Component Value Date     06/09/2017    K 3.8 06/09/2017     06/09/2017    CO2 27 06/09/2017    BUN 22 06/09/2017    CREATININE 0.80 06/09/2017    CALCIUM 8.9 06/09/2017     Lab Results   Component Value Date    WBC 9.6 06/09/2017    HGB 10.8 (L) 06/09/2017    HCT 33.5 (L) 06/09/2017    MCV 90 06/09/2017     06/09/2017     Lab Results   Component Value Date    CHOL 159 06/05/2014    TRIG 85 06/05/2014    HDL 58 06/05/2014     Lab Results   Component Value Date     04/01/2017     ECG personally reviewed by myself shows normal sinus rhythm within normal limits.     Review of Systems:     Review of Systems:   General: Night Sweats  Eyes: Visual Distubance  Ears/Nose/Throat:  WNL  Lungs: Shortness of Breath  Heart: Shortness of Breath with activity  Stomach: Constipation, Diarrhea  Bladder: WNL  Muscle/Joints: Joint Pain  Skin: Poor Wound Healing  Nervous System: Daytime Sleepiness, Loss of Balance  Mental Health: Confusion     Blood: Easy Bruising

## 2021-06-11 NOTE — PROGRESS NOTES
Physical Therapy  PHYSICAL THERAPY  MOVEMENT DISORDER:  INITIAL EVALUATION    SUBJECTIVE INFORMATION    Diagnosis: Parkinson's Disease  Date of diagnosis: Summer 2010    Date of last Neurologist visit: 12/14/2017 - Dr. Black,  6/9/2017 - SIM Quiñonez  Treatment Diagnosis: decreased functional mobility  Precautions/pmh: bilateral hip replacements and R knee replacement, HTN controlled medication, arthritis throughout body, specifically in hands  First movement disorder symptom presentation: tremor in R hand                                  Date: 2009  Non-motor symptoms: Loss of smell, Depression, Anxiety, Orthostatic hypotension, Sleep disturbances, Fatigue, Restless Leg Syndrome and loss of apetite     Time of Evaluation: 1500       Time of last PD med: 800       Time of next PD med: 2200  On/Off presentation/symptoms: R tremor, increased stiffness  History of PD specific PT? Yes: When and where?: [facility] - Summer 2016    Pain: No    Living Situation:Home, oldest son lives with patient  Caregiver Support: son available 24/7.  Equipment: R AFO, FWW and wheelchair.  Pt does have a shower bench, grab bars and bed rail  Current Activity Level: Patient does not do much activity and spends the majority of the time in bed. She does walk the hallway with her walker on a daily basis. Reports she is able to perform bed mobility without A but requires much effort.  Requires assistance for transfers and ambulation.  Pt recently had issues with low blood pressure, which is now managed and she is currently not on any BP medication.    Chief Mobility Complaint: decreased strength  Patient's Functional Goal: improve strength    Fall history:pt unable to state frequency, but notes that she has not fallen to the ground.  Will loose her balance and land into the wall or furnature.     Freezing: Often - about once a day      OBJECTIVE INFORMATION    Cognition: see OT evaluation    Bradykinesia and Hypokinesia  (Combining slowness, hesitency, decreased arm swing, small amplitude and poverty of movement in general):  Marked    Dyskinesia:No    Posture: significantly increased thoracic kyphosis and downward gaze with rounded shoulder and forward head posture.      Postural Stability: Posterior tug test (Response to sudden posterior displacement produced by pull on shoulders while patient is erect, with eyes open and feet slightly apart.  Patient is prepared.)  Unable to stand without assistance    Transitional Movements  Sit?Stand:   min to modA with sit to stand.  CGA for stand to sit       Sit? Supine:   Not Tested    Pivot transfer:   ModA and VC for proper hand placement and foot placement. Increased use of grab bars   Toilet transfer:   Mod A with heavy use of UE support on grab bars.   360 degree turn:  NT   TUG(Timed Up and Go): NT     (>13 sec:  Falls Risk)      Strength   30 Second Chair Stand:  # NT - unable to complete stand without A   MMT: grossly 3+/5 throughout except hip abduction 3/5 B.      Gait   Preferred Gait Speed  6 meters in 82 seconds Meters/second: 0.07 Age/Gender Norm:   1.15 meters/second  Assistive Device: FWW, CGA and wheelchair follow  Gait Speed Fall risk:  High Falls Risk (<0.6 m/s)  Gait Analysis: very short step length, increased genu valgus R>L, R AFO, lacks heel strike B and significant foot clearance. Downward gaze and flexed posture      Activity Tolerance   6 minute walk test:  32.8 feet with FWW and pt stopped after 2.5 minutes due to fatigue    Age/Gender Norm: 1278 feet      Assessment:  Patient is a 83 yo female with Parkinson's Disease x 7 years who presents with decreased functional mobility and increased weakness. She has had about 3 hospitalizations in the past 6 months for fatigue and blood pressure issues. Her low blood pressure issues have been recently managed. Overall she has a very sedentary lifestyle at this time with family providing assistance from all ADLs and  required dependent care for incontinence during evaluation. Physical therapy evaluation revealed significant lower extremity weakness which affects transfers and ambulation and leads to increased burden of care.  Additionally, she ambulates at a significantly slow gait speed of 0.07m/s which places her at a high fall risk and well below age/gender norms.  Patient does appear to have had a significant decline in functional mobility compared to one year ago, however is more appropriate for home care PT to address basic functional mobility and strength in order to decrease burden of care.  Outpatient physical therapy is not appropriate at this time.     Evaluation only    MEDICARE PATIENTS:  [facility] # [id number]  Provider #   Certification Dates: from 6/15/2017  to 6/16/2017    Total OP Minutes: 50    Rx Units: Evaluation    Physician Recommendation:  1. I certify the need for these services furnished within this plan and while under my care. I agree with the therapist's recommendation for plan of care.  2. If there is any recommendation for modification of therapy plan, please indicate below.

## 2021-06-11 NOTE — PROGRESS NOTES
Occupational Therapy  Occupational Therapy  Movement Disorders/Parkinson's Occupational Therapy Initial Evaluation    Medicare Insurance           Units: 1 Evaluation  Total Minutes: 65 minutes     Medical Diagnosis: Parkinson's Disease   Treatment Diagnosis:Lack of Coordination 781.3, Muscle weakness (general) 729.87, Cognitive Impairment 294.9 and Dyspnea 786.0    Referring Practitioner: SIM Quiñonez  Date of Onset:  05/05/2017  Start of Care: Kelly 15, 2017    ASSESSMENT  Patient arrived to therapy appearing somewhat disheveled, alone in her personal manual wheelchair. Throughout session, patient appeared confused with overall purpose of appointment despite explanation and education. Patient demonstrated decreased GMC/FMC as evidenced by 9 hole peg test and box and block test. Patient has severe arthritis in B hands, appear contracted. Last year when patient attended therapies, occupational therapist fabricated B resting hand splints to be worn at night. Per patient- she continues this splinting schedule and had no complaints of splints. More drastic than the decreased coordination in B UE's, was the cognitive decline evidenced in patient through MOCA/ACL. Last year when patient attended therapy patient scored 26/30 on MOCA on 06/08/2016. Today, patient scored 18/30 indicating severe cognitive deficits. Areas of deficits include short term memory/recall, abstract thinking, executive functioning, language, concentration/attention. Patient also completed cognitive assessment ACL. Her score indicates moderate functional impairment and recommendations supervision/assistance with ADLs/IADLs as judgement/safety may be impaired. Patient also demonstrated significant weakness during sit-stand transfer. Therapist observed strong odor from patient and noticed stain on patient's bottom. During her next (PT) session, patient required two person assistance to go to bathroom and for toileting hygiene. Patient was  "incontinent of urine and stool. Some of the stool appeared old. Per patient- she reports her neighbors assist with her toileting and that her son does not assist with this task. Ultimately, patient is not appropriate for 1:1 skilled OT at this time. Last year, occupational therapy accomplished recommendations for ADLs- including built up handles for utensils and due to severe arthritis FMC exercises are contraindications. In addition, due to patient's significant cognitive decline, patient requires assistance for ADLs/IADLs which she is receiving through son. Patient may not adhere or benefit from new learning at this time. Furthermore, due patient's significant weakness and incontinence issues- patient may be more appropriate for home health. This therapist is concerned that home health stopped- as they were assisting patient with bathing and from what was observed at this session, patient requires maximum assistance for daily activities such as toileting and clothing management.     Recommendations: Patient is not appropriate for 1:1 skilled OT at this time.    SUBJECTIVE  Pain: No    Past Medical History: Parkinson's, hip and knee surgery, orthostatic hypotension, arthritis in B hands    Current Level of Function:  Home  Shopping: son   Meals: son  Housework: son- though he \"doesn't like to clean\"  Laundry: son  Driving: son drives- patient quit driving  Medications: son sets up medications  Finances: son is in charge of patient's finances   Stairs required: 2 steps, son assists with transfers by using WCs    Motor Impairments:  Rigidity, Edwin Kinesia, Dysarthria  and Stoop Posture                 OBJECTIVE  U/E Motor Assessments: Dominant Upper Extremity: Right   Has B resting hand splints she wears nightly    L R   AROM Within Functional Limits Within Functional Limits   GMC Impaired Impaired   Strength Below Functional Limits Below Functional Limits      L L Norms R R Norms   9 HPT 90 seconds Female 71+ years " old: 24.11 seconds 63 seconds Female 71+ years old: 22.49 seconds   Box & Blocks 29 blocks Female 75+ years old: 63.6 blocks 27 blocks Female 75+ years old: 65.0 blocks    N/T Females 75+ years old: 37.6 lbs. N/T Females 75+ years old: 42.6 lbs.       Functional Assessments:   Independent / Needs   Assist Comments   Eating Built up handles- sometimes uses Son assists with cutting meat    Grooming Getting more difficult - electric tooth brush    UB Dressing Difficult time with buttons Son assist    LB Dressing Independent with pants/underwer  Son assists with orthotic, ties shoes    Bed Mobility Independent - difficult to get into bed     Basic Transfers Difficult- sometimes needs boost     Tub Transfers  Home health care used to bathe - not coming anymore - tub transfer bench - grab bars    Toilet Transfers Uses commode in bedroom    Meal Prep Son completes meal prep       Handwriting/Keyboarding: reports difficulties with writing, does not sign name anymore. Dk(patient's son) signs everything for patient.     Physical Performance Test (7 item):Not tested due to time constraint.     Cognitive Assessments:   MOCA: 18/30 (>26/30 is considered 'normal')   ACL 4.8/5.8 Level 4.8:   Patient may live alone with daily assistance to monitor safety and check problem solving.      Visual-Perceptual Skills:  N/T    MEDICARE PATIENT: Yes: [facility] #[id number]; Provider # ; Certification Dates: from 06/15/2017 to 08/15/2017    Physician Recommendation:  1. I certify the need for these services furnished within this plan and while under my care. I agree with the therapist's recommendation for plan of care.  2. If there is any recommendation for modification of therapy plan, please indicate below.

## 2021-06-11 NOTE — PROGRESS NOTES
.  Assessment:     1. Parkinson's Disease  2. Depression  3. History of low Hgb  4. Generalized weakness    Plan:     PD: no changes in medications  Therapies: continue with home health  Exercise- encouraged  REM Sleep Disorder -start Nuplazid, Nuplazid stopped by ER due to low b/p.  Falls - monitor B/P, compression stocking encourage fluids  Anxiety/Depressed- start Nuplazid, continue with Remeron and Venlafaxine  Next Appointment - with me in 4 weeks    The patient returns to the [facility] for a follow up visit, the patient was last seen by me on 5/5/17, where I changed the timing of the patient's Sinemet CR. The patient is still having problems with low b/p. Her hypertensive medication was lowered. I did suggest that the son take b/p before giving the patient her hypertensive medication and hold if patient has a low b/p. The patient has been trying to drink more water, is wearing compression stockings and exercising when able to stand.. She will be seen by our therapies and hopefully they can help the patient gain strength and balance. The patient's parkinson's looks to be under good control, I do not want to increase Sinemet due to the potential to lower B/P. I will also do some labs to make sure there is not acute medication reason for the low b/p.    Subjective:        PD summary: Dr. Black initially saw Mrs. Hills in 8/2013. She is a right handed white female who started to have right side tremor about in about 2010. He was initially diagnosed with Parkinson disease by her neurologist at [facility]. She has been placed on carbidopa, levodopa. She was increased to 50/200 extended release 3 times a day but she felt lightheaded and dizzy, subsequently she decreased to 1 pill in the morning, half tablets in the early afternoon. Hand writing was getting smaller. She did feel the medication was helping her left-sided tremor. She has some mild balance issue but it was multifactorial. She  has been seen at [facility], she was not satisfied with their care, came to this clinic after contacting [facility] and after looking up all the doctors names for PD. Last visit with Marielos was about summer of 2015. Was not been able to walk Sept 2015, due to weakness. Has therapies without benefit (PD therapies.). She took her self off Sinemet 25/250 TID thanksgiving of 2015. Still took Sinemet CR 50/200 HS. She cannot push up to get out of chair, speech softer, Can walk only cross the room, about 4.5 min for 25 feet. Very slow. Right leg drags more. No tremor, feels SOB. No known lung and heart conditions. She is trying inspirators. She feels in general, she is a little clumsy feeding herself, but no particularly dysphagia. Symptoms worsen after the d/c of the sinemet.     3/15/17 [place] she is very active and exercise regularly., patient is currently very weak and is no longer able to ambulate, b/p drops significantly when standing. She lives with her son who helps with things around the house. She does have a cane, normally walks with that. She is hard of hearing and wears hearing aids. She did lost right side hearing aid, but is wearing the left side. She has a hysterectomy, bilateral hip surgery x4, knee surgery in 2006, had cholecystectomy and left kidney stones. She has a history of hypertension, osteoporosis, asthma, monoclonal gammopathy of unknown significance, knee surgery, glaucoma, hypernephroma surgically resected on the left side.  4/12/17  The patient has had a rapid decline since the last appointment. At last appointment the patient was staying in a transitional care unit after a hospitalization for low hemoglobin and gastrointestinal bleeding.  She was about to be discharged home on 3/31/17 and the next day home care staff came to evaluate the patient and found that the patient had an episode where she appeared weak, pale color to her skin, decreased blood pressure, so she was transferred to  "the ER. She has been very weak since, her B/P meds have been readjusted, I have also ordered compression stockings and a wheelchair for the patient. I don't believe patient actually started the Nuplazid, which could be due to the multiple moves since I last saw her. Patient reports not sleeping and \"being exhausted\" I will have my RN check into this. Sinemet 25/100, 2 tabs BID noon and 1800, Sinemet  50/200 CR 1 tab at 10 pm  5/5/17 The patient was again hospitalized for low B/P. While in the ER the neurologist discontinued the Nuplazid because the medication has a risk factor of causing low B/P. The patient was having B/P problems before starting the Nuplazid. They did readjust her b/p medication again. She presents her today reporting that she is very weak, patient does have therapies coming to her home but he son reports they are not doing that much. I would like for the patient to come here for therapies, the patient was walking a month ago. I would like to get the patient back to her baseline. I will also split her HS dose of Sinemet CR, now she will take Sinemet CR 50/200 0.5 tab BID at 0800 and 2200, we will see if this helps her stiffness during the day.     PD: Sinemet 25/100, 2 tabs BID noon and 1800, Sinemet  50/200 CR 1 tab at 10 pm. No side effects. Patient is stiff in the am so I will change Sinemet CR 50/200 to 0.5 tab BID at 0800 and 2200  RLS- . denies  ADL -  Is assist of one with ADL, she is no longer able to ambulate and she is very week. Patient is currently living with son and nurse and aid come to house. Son does assist with ADLs  Driving- does not drive   Exercise - presently has therapies coming to her house, but will switch to therapies here  Falls - is suddenly W/C bound, she will be starting parkinson therapies here at [facility]  History of low Hgb- was in the hospital for 6 days Hgb was 6.0, is being monitored by primary  Medication Side Effects - denies any side effects  Memory/Mild " cognitive impairment -feeling ok. No changes. MOCA 25/30 in 5/2016  Anxiety/Depression - patient is very tired, is currently on Remeron and Venlafaxine, will discuss at next appointment  REM sleep disorder: complains of vivid dreams, daytime sleepiness, her sleep is poor, she never feels rested, started Nuplazid but it was discontinued d/t low b/p, continue with Remeron  Dyskinesia- none  Orthostatics: has been having severe ortho stasis, b/p meds were just adjusted, was admitted to the ER on 4/21/17 for low B/P, Nuplazid was d/c, and B/P meds again adjusted  Advanced Directives - completed  Surgery - dementia level of impairment not a good candidate  Drooling - denies problem    Patient Active Problem List    Diagnosis Date Noted     Orthostatic hypotension 04/21/2017     Dementia without behavioral disturbance, unspecified dementia type      Constipation 03/08/2017     Symptomatic anemia 02/27/2017     Fall 02/27/2017     SOB (shortness of breath) 02/27/2017     Gastric AVM 02/27/2017     Melena 02/27/2017     Parkinson disease 05/18/2016     Arthritis 05/18/2016     Memory difficulty 05/18/2016     Drooling 05/18/2016     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     HTN (hypertension), benign 05/18/2016     [place] (hard of hearing), bilateral 05/18/2016     Tremor 08/02/2014     Past Medical History:   Diagnosis Date     Anemia      Asthma      Glaucoma      HTN (hypertension)      OP (osteoporosis)      Parkinson disease      Vitamin D deficiency      Word finding difficulty 2016     Past Surgical History:   Procedure Laterality Date     ESOPHAGOGASTRODUODENOSCOPY N/A 2/27/2017    Procedure: ESOPHAGOGASTRODUODENOSCOPY (EGD) with APC cautery usage;  Surgeon: Kelton Sr MD;  Location: [facility];  Service:      HIP ARTHROPLASTY Right      HIP SURGERY Left      HYSTERECTOMY       KNEE SURGERY Right      NEPHRECTOMY       Family History   Problem Relation Age of Onset     Parkinsonism Paternal Uncle      Heart  disease Mother      Heart attack Father      Pneumonia Brother      Heart murmur Daughter      Diabetes Son      No Medical Problems Son      Current Outpatient Prescriptions   Medication Sig Dispense Refill     carbidopa-levodopa (SINEMET CR)  mg per tablet Take 0.5 tablets by mouth 2 (two) times a day.        carbidopa-levodopa (SINEMET)  mg per tablet Take 2 tablets by mouth 2 (two) times a day. Take around noon and 6pm       cholecalciferol, vitamin D3, 5,000 unit Tab Take 1 tablet by mouth daily.       ferrous sulfate 325 (65 FE) MG tablet Take 1 tablet by mouth daily with breakfast.       lisinopril (PRINIVIL,ZESTRIL) 5 MG tablet Take 1 tablet (5 mg total) by mouth daily. (Patient taking differently: Take 2.5 mg by mouth daily. Changed per Dr Smith on 4/28/17 per son Dk.   Indications: hypertension) 30 tablet 0     menthol-zinc oxide (CALMOSEPTINE) 0.44-20.6 % Oint ointment Apply 1 application topically 2 (two) times a day.       mirtazapine (REMERON) 45 MG tablet Take 45 mg by mouth bedtime.       omeprazole (PRILOSEC) 20 MG capsule Take 1 capsule (20 mg total) by mouth 2 (two) times a day before meals. 60 capsule 0     polyvinyl alcohol (LIQUIFILM TEARS) 1.4 % ophthalmic solution Apply 1 drop to eye as needed for dry eyes.       senna-docusate (SENNOSIDES-DOCUSATE SODIUM) 8.6-50 mg tablet Take 1 tablet by mouth 2 (two) times a day as needed for constipation.       venlafaxine (EFFEXOR) 37.5 MG tablet Take 37.5 mg by mouth every morning.       No current facility-administered medications for this encounter.        Allergies   Allergen Reactions     Crestor [Rosuvastatin]      Paroxetine      Penicillins      Pramipexole      Statins-Hmg-Coa Reductase Inhibitors      Zetia [Ezetimibe]      Social History     Social History     Marital status:      Spouse name: N/A     Number of children: N/A     Years of education: N/A     Occupational History     Not on file.     Social History Main  Topics     Smoking status: Never Smoker     Smokeless tobacco: Never Used     Alcohol use No     Drug use: No     Sexual activity: No     Other Topics Concern     Not on file     Social History Narrative    Her son lives with her in a single family home     The following portions of the patient's history were reviewed and updated as appropriate: allergies, current medications, past family history, past medical history, past social history, past surgical history and problem list.    Review of Systems  A comprehensive review of systems was negative except for: what was noted above      Objective:     Vitals:    06/09/17 1252   BP: 95/57   Pulse: 79     I asked her to call with any questions or concerns and will see her again in clinic in about 4 weeks . We discussed the risks and benefits of the medication including risk of worsening depression with medication adjustments and even the possibility of emergence of suicidally      Total time spent with the patient today was 25 minutes with greater than 50% of the time spent in counseling and care coordination.       She is a very thin and frail looking, but looking better. Left leg is about 1 to 2 inches shorter than the right. Right ankle with brace. She has normal mental status, language and but a little soft speech. Cranial nerves 2 through 12 significant for hard of hearing (She did lost right side hearing aid, but is wearing the left side) and wearing glasses, otherwise unremarkable. She is sitting in chair comfortable, in no acute distress. Language normal. She has decreased muscle bulk in right legs and complete foot drop on the right side with flaccid tone around the ankle. Some numb feeling in right leg. Absent deep tendon reflexes in bilateral ankles, diminished in the knees bilaterally.  No Babinski sign. No tremors, no rigidities in upper extremities. Hand coordination seemed to be poor, worse on right, decreased amplitudes. Some ulnar deviation of fingers  and contractions in hands, worse on right. She does have more spontaneous movement in the left side upper extremity, compared to the right side. Gait and station deferred. There is no leg swelling at this point. She is atraumatic, normocephalic. Some drooling.

## 2021-06-11 NOTE — PROGRESS NOTES
"Speech Language/Pathology  Speech Therapy Outpatient - Parkinson's Voice Evaluation     Kita Hills  YOB: 1932     [id number]     Referring Provider: Erica Bonilla FNP      MEDICARE PATIENTS:  [facility] # [id number] Provider #   Certification Dates: from 6/15/17 to 6/15/17      Date of Onset:  May 2016  Start of Care: 6/15/2017  Medical Diagnosis: Parkinson's Disease   Treatment Diagnosis:  voice  Visit Number 1 of 1.  Interventions provided during this session: speech/language 60 minutes    Subjective  Patient presents as alert and cooperative during this session.  Patient reports She is currently in no pain.  Patient states she is here today for evaluation by a Speech Therapist because \"You wanted to evaluate me...\" Upon further inquiry by therapist patient reported \"I want to get rid of this raspy voice. Sometimes when I try to speak louder it is less raspy.\"   Patient sitting alone in waiting room upon therapist's arrival. She reporting her son dropped her off because he \"had things to do.\"      History of speech and communication changes:    Patient is a 84 year old woman diagnosed with PD since 2010. Pt has received speech therapy in the past. Specifically, she has undergone x3 courses of speech therapy in the past 2 years, including outpatient therapy at [facility] (specfici dates unknown), [facility] for a total of 4 session from 6/10/16 - 7/11/16 and most recently completed 7 sessions of speech therapy with [facility] from 4/10/17 - 5/12/17. NOTE: reason for discharging speech therapy for both Home Care and outpatient therapy at [facility] was due to lack of progress. Patient demonstrates no carry over of techniques/strategies to improve voice and speech outside of therapy.The patient does not report changes with communication, but compliant of harsh vocal quality being bothersome -persists.       Objective  Oral " "motor function is mildly impaired. Decreased labial retraction on the left vs right side of mouth and some decreased movement noted on the left during speech.  Lingual strength and range of motion appeared grossly intact and symmetrical for protrusion and lateralization. No impairment noted with pucker. Decreased ROM and coordination with lingual elevation and depression. Overall, decreased speed with motor movements. This appears consistent with oral motor evaluation done on 6/14/2016.   Patient presents with facial masking. Pt is not aware of facial masking.     Sustained /AH/ task: Patient able to sustain phonation for an average of 4 seconds with max cues to diaphragmatic breath support  take deep breathing and project voice across the room in order to improve amplitude, volume and quality during last speech therapy session on 7/8/16. Noted increased difficulty coordinating respiration and speech during this task. Pt would frequently hold her breath after inhalation, yet also attempt to phonate. Cues ineffective, but patient able to achieve \"acccidentally\" on several occasions.    Duration in seconds Quality of voice Volume range of voice at a distance of 12 inches Does pt maintain strong steady effort for entire duration of task? Is mouth opening decreased with task? Is this task impaired?   Sustained /AH/ without cues 3.43 Hoarse, strained  72 dBSPL No No Yes   Sustained /AH/ with cues for increased amplitude 3.38 Hoarse, stained  79 dBSPL No No Yes         Reading Aloud task:   Range of voice volume at a distance of 12 in, DBSPL.  Voice Quality Is careful listening Needed? Why? Is there decreased amplitude with voice? Does it fluctuate? Is facial masking noted? Is speech monotone?   Paragraph:  [place]  (100 word sample). 63 to 67 dBSPL  Hoarse/  Harsh, Gravely  No, quiet environment.  Yes, Yes - towards the end of utterances, as well as for the last several sentences of the paragraph.  Yes Yes  " "  Paragraph:  [place]   with cue to increase amplitude  64 to 68 dBSPL  Hoarse/  Harsh,   Gravely  No, quiet environment.  Yes, Yes - see response above.  Yes. Did it improve from the first attempt? No Yes    Short phrases: with cue to increase amplitude  64 to 73 dBSPL  Gravely  No  Yes, but improved compared to conversational speech and reading tasks. No  Yes  Yes      Conversational Speech Sample:   Range of voice volume at a distance of 12 in, DBSPL.  Voice Quality Is careful listening Needed? Why? Is there decreased amplitude with voice? Does it fluctuate? Is facial masking noted? Is speech monotone?   Short Conversational Speech Sample  Less than 60 to 67 dBSPL  Hoarse/  Harsh, Gravely  Inconsistently Yes, Yes - stronger at the start of utterances/  Phrases, but overall remains significantly impaired through out task.  Yes  Yes   Performance with all tasks were reviewed with the patient using an iPad. Patient reported she was unable to hear herself upon audiotape review of paragraph reading task due to reduced volume. Upon self-assessment of task prior to reviewing audiotape recording pt stated  \"I can hear the raspy-ness. I would prefer to read in just a normal voice.\" Education provided regarding vocal fold function and respiratory support for speech. As well as the impact positioning of a communication partner during conversation and environmental noise has on overall communication. Patient continues to demonstrate poor respiratory support for speech as evidenced by vocal volume fading towards the end of utterances and frequent pauses needed during conversational speech in order to take a breath ( produced ~ 3-4 words per breath at the most) and increase in work of breathing immediately following longer utterances.     Diaphragmatic breathing: introduce concept of deep breathing (belly vs chest). Educated patient to correct completion of breathing, pt benefited from instructions to \" breath in " "through the nose and out through the mouth.\" Cues to actively vs passively exhale provided with visual feedback (hold tissue several inches from the patient's mouth and look for movement upon exhalation) were beneficial. Patient accurately completed 4 out of 6 repetitions with minimal cueing.      Patient has no complaints  of dysphagia.      Assessment  Patient presents with deficits in voice, as well as baseline cognitive deficits notable during evaluation. Overall, in a quiet environment and with therapist facing patient speech is 100% intelligible. Anticipate that intelligibility decreased as environmental noises become louder and when listener is not sitting directly across from patient. Speech and voice impairments are characterized by monotone and slow rate of speech, as well as hoarse/harsh and gravely vocal quality that patient reports is bothersome. No significant changes noted when comparing results from voice evaluation completed on 6/14/16 to today. Minimal improvement noted in vocal quality during structured tasks or causal conversation with verbal cues and/or models to strategies to improve amplitude.     Patient is not a good candidate for speech therapy and rehab potential is judged as poor due to decreased stimulbility for improved voice, progressive nature of disease and lack of progress across x3 courses of speech therapy treatment, but especially during most recent course ending ~ 1 month ago. Suspect cognitive deficits (pt scored 18/30 on the MOCA as administered today during occupational therapy evaluation) negatively affecting patient's ability to recall, as well as implement strategies to increase amplitude and improve voice. Patient reports having copies of written instructions for voice exercises given during previous speech therapy somewhere at home.      Patient participated in education regarding evaluation results and home program and verbalized understanding with education throughout " "evaluation. Patient verbalized comprehension and agreement. SLP provided patient with written instructions for home practice, appropriate strategies to stephanie for improvement in voice and speech, as well as multiple copies of posters for patient to post around her house to provide visual reminders to \"talk loud/shout.\"        Plan  Evaluation only.       Physician Recommendation:  1. I certify the need for these services furnished within this plan and while under my care. I agree with the therapist's recommendation for plan of care.  2. If there is any recommendation for modification of therapy plan, please indicate below.    Physician Signature: __________________________________________        "

## 2021-06-11 NOTE — PROGRESS NOTES
How have you been doing since we last saw you? any concerns? F/u appt, not taking Nuplazid. Not able to get a weight done hard at standing.       Current Symptoms : No

## 2021-06-12 NOTE — PROGRESS NOTES
[facility]    Facility:   [facility] [[id number]]   Code Status: FULL CODE       Chief Complaint   Patient presents with     Follow Up     TCU 8/3/17.       HPI:   Kita is a 84 y.o. female with Parkinsons disease who was admitted from the emergency department on July 23, 2017 for diverticulitis. She complained of abdominal pain, increasing somnolence and fatigue for more than one day. She had soft bowel movements per family. Had temperature spike of 102.5 in ER. CT showed: 1.  There is marked thickening and enhancement of the wall of the right colon with stranding in the adjacent fat but sparing of the tip of the cecum. There is less prominent wall thickening elsewhere in the colon. This may represent colitis. Some of the small bowel loops in left side of the abdomen are mildly prominent. 2.  The patient has had a left nephrectomy. She was started on Levaquin and Flagyl and had good resolution.  She continued her Parkinson medication.  She was able to tolerate a normal diet.  She was discharged on Levaquin 500 mg daily for 7 days and metronidazole 500 mg twice daily for 7 days. Overall stabilized and discharged to TCU on 7/26/17 for PT, OT, nursing cares, medical management and monitoring.       Past Medical History:  Past Medical History:   Diagnosis Date     Anemia      Asthma      Dementia      GI bleed      Glaucoma      HTN (hypertension)      Hypotension      OP (osteoporosis)      Parkinson disease      Parkinson's disease      Vitamin D deficiency      Word finding difficulty 2016       Medications:  Current Outpatient Prescriptions   Medication Sig     carbidopa-levodopa (SINEMET CR)  mg per tablet Take 0.5 tablets by mouth 2 (two) times a day.      carbidopa-levodopa (SINEMET CR)  mg per tablet Take 0.5 tablets by mouth 2 (two) times a day. AM and HS     carbidopa-levodopa (SINEMET)  mg per tablet Take 2 tablets by mouth 2 (two) times a  day. Take around noon and 6pm     carbidopa-levodopa (SINEMET)  mg per tablet Take 2 tablets by mouth 2 (two) times a day. 1200 and 1800     cholecalciferol, vitamin D3, 5,000 unit Tab Take 1 tablet by mouth daily.     cholecalciferol, vitamin D3, 5,000 unit Tab Take 5,000 Units by mouth daily.     ferrous sulfate 325 (65 FE) MG tablet Take 1 tablet by mouth daily with breakfast.     ferrous sulfate 325 (65 FE) MG tablet Take 1 tablet by mouth daily with breakfast.     menthol-zinc oxide (CALMOSEPTINE) 0.44-20.6 % Oint ointment Apply 1 application topically 2 (two) times a day.     mirtazapine (REMERON) 45 MG tablet Take 45 mg by mouth bedtime.     mirtazapine (REMERON) 45 MG tablet Take 45 mg by mouth at bedtime.     omeprazole (PRILOSEC) 20 MG capsule Take 1 capsule (20 mg total) by mouth 2 (two) times a day before meals.     omeprazole (PRILOSEC) 20 MG capsule Take 20 mg by mouth 2 (two) times a day before meals.     polyvinyl alcohol (LIQUIFILM TEARS) 1.4 % ophthalmic solution Apply 1 drop to eye as needed for dry eyes.     senna-docusate (SENNOSIDES-DOCUSATE SODIUM) 8.6-50 mg tablet Take 1 tablet by mouth 2 (two) times a day as needed for constipation.     venlafaxine (EFFEXOR) 37.5 MG tablet Take 37.5 mg by mouth every morning.     venlafaxine (EFFEXOR) 37.5 MG tablet Take 37.5 mg by mouth every morning.       Subjective:  She is done with Flagyl and Levaquin for diverticulitis. No abdominal pain or fever. No cramping. No nausea. Feeling stronger. Appetite is fair. No fever, cough or congestion. Denies dizziness or shortness of breath.       Physical Exam:   General: Patient is alert, pleasant elderly female, no distress.   Vitals: /83, Temp 97.8, Pulse 81, RR 16, O2 sat 97% RA.  HEENT: Head is NCAT. Eyes show no injection or icterus. Nares negative. Oropharynx well hydrated.  Neck: Supple. No tenderness or adenopathy. No JVD.  Lungs: Clear bilaterally. No wheezes.  Cardiovascular: Regular rate and  rhythm, normal S1, S2.  Back: No spinal or CVA tenderness.  Abdomen: Soft, no tenderness on exam. Bowel sounds present. No guarding rebound or rigidity.  Extremities: No edema is noted.  Musculoskeletal: Age related degen changes.  Psych: Mood appears good.      Labs:  Component      Latest Ref Rng & Units 7/23/2017 7/24/2017 7/24/2017 7/24/2017            5:32 AM  9:26 AM  6:36 PM   WBC      4.0 - 11.0 thou/uL 16.0 (H) 16.2 (H) 17.3 (H)    RBC      3.80 - 5.40 mill/uL 3.57 (L) 3.70 (L) 3.62 (L)    Hemoglobin      12.0 - 16.0 g/dL 10.1 (L) 10.2 (L) 10.1 (L) 9.1 (L)   Hematocrit      35.0 - 47.0 % 30.7 (L) 33.0 (L) 31.3 (L)    MCV      80 - 100 fL 86 89 87    MCH      27.0 - 34.0 pg 28.3 27.6 27.9    MCHC      32.0 - 36.0 g/dL 32.9 30.9 (L) 32.3    RDW      11.0 - 14.5 % 14.7 (H) 14.8 (H) 14.7 (H)    Platelets      140 - 440 thou/uL 171 121 (L) 154      Component      Latest Ref Rng & Units 7/25/2017             WBC      4.0 - 11.0 thou/uL 12.1 (H)   RBC      3.80 - 5.40 mill/uL 3.27 (L)   Hemoglobin      12.0 - 16.0 g/dL 9.0 (L)   Hematocrit      35.0 - 47.0 % 28.1 (L)   MCV      80 - 100 fL 86   MCH      27.0 - 34.0 pg 27.5   MCHC      32.0 - 36.0 g/dL 32.0   RDW      11.0 - 14.5 % 14.6 (H)   Platelets      140 - 440 thou/uL 121 (L)       Component      Latest Ref Rng & Units 7/23/2017 7/24/2017 7/25/2017 7/25/2017             5:53 AM  1:59 PM   Sodium      136 - 145 mmol/L 137 141 138    Potassium      3.5 - 5.0 mmol/L 3.4 (L) 3.6 3.3 (L) 3.2 (L)   Chloride      98 - 107 mmol/L 102 107 108 (H)    CO2      22 - 31 mmol/L 25 25 27    Anion Gap, Calculation      5 - 18 mmol/L 10 9 3 (L)    Glucose      70 - 125 mg/dL 163 (H) 120 117    BUN      8 - 28 mg/dL 23 14 9    Creatinine      0.60 - 1.10 mg/dL 0.78 0.69 0.63    Calcium      8.5 - 10.5 mg/dL 8.2 (L) 8.2 (L) 7.7 (L)    GFR MDRD Af Amer      >60 mL/min/1.73m2 >60 >60 >60    GFR MDRD Non Af Amer      >60 mL/min/1.73m2 >60 >60 >60      Component      Latest Ref  Rng & Units 7/25/2017          11:11 PM   Sodium      136 - 145 mmol/L    Potassium      3.5 - 5.0 mmol/L 4.4   Chloride      98 - 107 mmol/L    CO2      22 - 31 mmol/L    Anion Gap, Calculation      5 - 18 mmol/L    Glucose      70 - 125 mg/dL    BUN      8 - 28 mg/dL    Creatinine      0.60 - 1.10 mg/dL    Calcium      8.5 - 10.5 mg/dL    GFR MDRD Af Amer      >60 mL/min/1.73m2    GFR MDRD Non Af Amer      >60 mL/min/1.73m2        Assessment/Plan:  1. Diverticulitis. Done with antibiotics. No abdominal pain or diarrhea or fever.  2. Parkinsons disease. Cont home Sinemet.  3. Deconditioning. Cont efforts in therapy.  4. HTN. No meds. Monitor BPs.  5. Anemia. Continue iron.  6. Malnutrition. On supplements.      Electronically signed by: Ngoc Herman MD

## 2021-06-12 NOTE — PROGRESS NOTES
[facility]      Facility:    [facility] [[id number]]  Code Status: FULL CODE       Chief Complaint/Reason for Visit:  Chief Complaint   Patient presents with     H & P     New admit to TCU after hospitalization for diverticulitis. (H & P 7/28/17).       HPI:   Kita is a 84 y.o. female with Parkinsons disease who was admitted from the emergency department on July 23, 2017 for diverticulitis. She complained of abdominal pain, increasing somnolence and fatigue for more than one day. She had soft bowel movements per family. Had temperature spike of 102.5 in ER. CT showed: 1.  There is marked thickening and enhancement of the wall of the right colon with stranding in the adjacent fat but sparing of the tip of the cecum. There is less prominent wall thickening elsewhere in the colon. This may represent colitis. Some of the small bowel loops in left side of the abdomen are mildly prominent. 2.  The patient has had a left nephrectomy. She was started on Levaquin and Flagyl and had good resolution.  She continued her Parkinson medication.  She was able to tolerate a normal diet.  She was discharged on Levaquin 500 mg daily for 7 days and metronidazole 500 mg twice daily for 7 days. Overall stabilized and discharged to TCU on 7/26/17 for PT, OT, nursing cares, medical management and monitoring.     She denies diarrhea or abdominal pain. Feels weak. No dizziness. No fever, cough or cold sx. Appetite is fair. No headache, nausea, or vomiting. No new hearing or vision problems. Participating with therapy.      Past Medical History:  Past Medical History:   Diagnosis Date     Anemia      Asthma      Dementia      GI bleed      Glaucoma      HTN (hypertension)      Hypotension      OP (osteoporosis)      Parkinson disease      Parkinson's disease      Vitamin D deficiency      Word finding difficulty 2016           Surgical History:  Past Surgical History:   Procedure Laterality  Date     ESOPHAGOGASTRODUODENOSCOPY N/A 2/27/2017    Procedure: ESOPHAGOGASTRODUODENOSCOPY (EGD) with APC cautery usage;  Surgeon: Kelton Sr MD;  Location: [facility];  Service:      HIP ARTHROPLASTY Right      HIP ARTHROPLASTY      left and right full     HIP SURGERY Left      HYSTERECTOMY       KNEE ARTHROSCOPY      right     KNEE SURGERY Right      NEPHRECTOMY       nephrectomy      left       Family History:   Family History   Problem Relation Age of Onset     Parkinsonism Paternal Uncle      Heart disease Mother      Heart attack Father      Pneumonia Brother      Heart murmur Daughter      Diabetes Son      No Medical Problems Son        Social History:    Social History     Social History     Marital status:      Spouse name: N/A     Number of children: N/A     Years of education: N/A     Social History Main Topics     Smoking status: Former Smoker     Smokeless tobacco: Never Used     Alcohol use No     Drug use: No     Sexual activity: No     Other Topics Concern     Not on file     Social History Narrative    ** Merged History Encounter **         ** Data from: 7/10/17 Enc Dept: [facility]     Her son lives with her in a single family home         ** Data from: 7/23/17 Enc Dept:  EMERGENCY DEPARTMENT    Lives with son       Medications:  Current Outpatient Prescriptions   Medication Sig     carbidopa-levodopa (SINEMET CR)  mg per tablet Take 0.5 tablets by mouth 2 (two) times a day.      carbidopa-levodopa (SINEMET CR)  mg per tablet Take 0.5 tablets by mouth 2 (two) times a day. AM and HS     carbidopa-levodopa (SINEMET)  mg per tablet Take 2 tablets by mouth 2 (two) times a day. Take around noon and 6pm     carbidopa-levodopa (SINEMET)  mg per tablet Take 2 tablets by mouth 2 (two) times a day. 1200 and 1800     cholecalciferol, vitamin D3, 5,000 unit Tab Take 1 tablet by mouth daily.     cholecalciferol, vitamin D3, 5,000 unit Tab Take 5,000 Units by  mouth daily.     ferrous sulfate 325 (65 FE) MG tablet Take 1 tablet by mouth daily with breakfast.     ferrous sulfate 325 (65 FE) MG tablet Take 1 tablet by mouth daily with breakfast.     levoFLOXacin (LEVAQUIN) 500 MG tablet Take 1 tablet (500 mg total) by mouth daily for 7 days.     menthol-zinc oxide (CALMOSEPTINE) 0.44-20.6 % Oint ointment Apply 1 application topically 2 (two) times a day.     metroNIDAZOLE (FLAGYL) 500 MG tablet Take 1 tablet (500 mg total) by mouth 2 (two) times a day for 7 days.     mirtazapine (REMERON) 45 MG tablet Take 45 mg by mouth bedtime.     mirtazapine (REMERON) 45 MG tablet Take 45 mg by mouth at bedtime.     omeprazole (PRILOSEC) 20 MG capsule Take 1 capsule (20 mg total) by mouth 2 (two) times a day before meals.     omeprazole (PRILOSEC) 20 MG capsule Take 20 mg by mouth 2 (two) times a day before meals.     polyvinyl alcohol (LIQUIFILM TEARS) 1.4 % ophthalmic solution Apply 1 drop to eye as needed for dry eyes.     senna-docusate (SENNOSIDES-DOCUSATE SODIUM) 8.6-50 mg tablet Take 1 tablet by mouth 2 (two) times a day as needed for constipation.     venlafaxine (EFFEXOR) 37.5 MG tablet Take 37.5 mg by mouth every morning.     venlafaxine (EFFEXOR) 37.5 MG tablet Take 37.5 mg by mouth every morning.       Allergies:  Allergies   Allergen Reactions     Crestor [Rosuvastatin]      Crestor [Rosuvastatin] Unknown     Paroxetine      Paxil [Paroxetine Hcl] Unknown     Penicillins      Penicillins Unknown     Pramipexole      Pramipexole Unknown     Statins-Hmg-Coa Reductase Inhibitors      Statins-Hmg-Coa Reductase Inhibitors Unknown     Zetia [Ezetimibe]      Zetia [Ezetimibe] Unknown         Review of Systems:  Pertinent items are noted in HPI.      Physical Exam:   General: Patient is alert, pleasant elderly female, no distress.   Vitals: Reviewed.  HEENT: Head is NCAT. Eyes show no injection or icterus. Nares negative. Oropharynx well hydrated.  Neck: Supple. No tenderness or  adenopathy. No JVD.  Lungs: Clear bilaterally. No wheezes.  Cardiovascular: Regular rate and rhythm, normal S1, S2.  Back: No spinal or CVA tenderness.  Abdomen: Soft, no tenderness on exam. Bowel sounds present. No guarding rebound or rigidity.  : Deferred.  Extremities: No edema is noted.  Musculoskeletal: Age related degen changes.  Skin: No rashes.   Psych: Mood appears good.      Labs:  Component      Latest Ref Rng & Units 7/23/2017 7/24/2017 7/24/2017 7/24/2017            5:32 AM  9:26 AM  6:36 PM   WBC      4.0 - 11.0 thou/uL 16.0 (H) 16.2 (H) 17.3 (H)    RBC      3.80 - 5.40 mill/uL 3.57 (L) 3.70 (L) 3.62 (L)    Hemoglobin      12.0 - 16.0 g/dL 10.1 (L) 10.2 (L) 10.1 (L) 9.1 (L)   Hematocrit      35.0 - 47.0 % 30.7 (L) 33.0 (L) 31.3 (L)    MCV      80 - 100 fL 86 89 87    MCH      27.0 - 34.0 pg 28.3 27.6 27.9    MCHC      32.0 - 36.0 g/dL 32.9 30.9 (L) 32.3    RDW      11.0 - 14.5 % 14.7 (H) 14.8 (H) 14.7 (H)    Platelets      140 - 440 thou/uL 171 121 (L) 154      Component      Latest Ref Rng & Units 7/25/2017             WBC      4.0 - 11.0 thou/uL 12.1 (H)   RBC      3.80 - 5.40 mill/uL 3.27 (L)   Hemoglobin      12.0 - 16.0 g/dL 9.0 (L)   Hematocrit      35.0 - 47.0 % 28.1 (L)   MCV      80 - 100 fL 86   MCH      27.0 - 34.0 pg 27.5   MCHC      32.0 - 36.0 g/dL 32.0   RDW      11.0 - 14.5 % 14.6 (H)   Platelets      140 - 440 thou/uL 121 (L)       Component      Latest Ref Rng & Units 7/23/2017 7/24/2017 7/25/2017 7/25/2017             5:53 AM  1:59 PM   Sodium      136 - 145 mmol/L 137 141 138    Potassium      3.5 - 5.0 mmol/L 3.4 (L) 3.6 3.3 (L) 3.2 (L)   Chloride      98 - 107 mmol/L 102 107 108 (H)    CO2      22 - 31 mmol/L 25 25 27    Anion Gap, Calculation      5 - 18 mmol/L 10 9 3 (L)    Glucose      70 - 125 mg/dL 163 (H) 120 117    BUN      8 - 28 mg/dL 23 14 9    Creatinine      0.60 - 1.10 mg/dL 0.78 0.69 0.63    Calcium      8.5 - 10.5 mg/dL 8.2 (L) 8.2 (L) 7.7 (L)    GFR MDRD Af  Amer      >60 mL/min/1.73m2 >60 >60 >60    GFR MDRD Non Af Amer      >60 mL/min/1.73m2 >60 >60 >60      Component      Latest Ref Rng & Units 7/25/2017          11:11 PM   Sodium      136 - 145 mmol/L    Potassium      3.5 - 5.0 mmol/L 4.4   Chloride      98 - 107 mmol/L    CO2      22 - 31 mmol/L    Anion Gap, Calculation      5 - 18 mmol/L    Glucose      70 - 125 mg/dL    BUN      8 - 28 mg/dL    Creatinine      0.60 - 1.10 mg/dL    Calcium      8.5 - 10.5 mg/dL    GFR MDRD Af Amer      >60 mL/min/1.73m2    GFR MDRD Non Af Amer      >60 mL/min/1.73m2        Assessment/Plan:  1. Parkinsons disease. Cont home Sinemet.  2. Diverticulitis. Finish courses of antibiotics. No abdominal pain or diarrhea or fever.  3. Anemia. No transfusion needed. She is on iron.  4. Depression. Cont home Mirtazapine and Venlafaxine.  5. Debility. Here for therapy for strengthening.  6. Code status is full code.    Total time greater than 45 minutes, greater than 50% counseling and coordination of care, time spent in interview and examination of patient, review of records, discussion with nursing staff.      Electronically signed by: Ngoc Herman MD

## 2021-06-12 NOTE — PROGRESS NOTES
[facility]    Facility:   [facility] [[id number]]   Code Status: FULL CODE  PCP: Reg Smith MD   Phone: [phone]   Fax: [phone]      CHIEF COMPLAINT/REASON FOR VISIT:  Chief Complaint   Patient presents with     Discharge Summary     [facility] TCU 7/26/17-8/26/17.       HISTORY COURSE:  Kita is a 84 y.o. female with Parkinsons disease who was admitted from the emergency department on July 23, 2017 for diverticulitis. She complained of abdominal pain, increasing somnolence and fatigue for more than one day. She had soft bowel movements per family. Had temperature spike of 102.5 in ER. CT showed: 1.  There is marked thickening and enhancement of the wall of the right colon with stranding in the adjacent fat but sparing of the tip of the cecum. There is less prominent wall thickening elsewhere in the colon. This may represent colitis. Some of the small bowel loops in left side of the abdomen are mildly prominent. 2.  The patient has had a left nephrectomy. She was started on Levaquin and Flagyl and had good resolution.  She continued her Parkinson medication.  She was able to tolerate a normal diet.  She was discharged on Levaquin 500 mg daily for 7 days and metronidazole 500 mg twice daily for 7 days. Overall stabilized and discharged to TCU on 7/26/17 for PT, OT, nursing cares, medical management and monitoring.     No complications during her TCU stay. Slow due to Parkinsons and related debility, poor strength and endurance. Care conference to set up home care as she lives with her son. 24 hour care recommended. Has some dizziness, long term chronic problem. Improved with prn meclizine as at home. BPs elevated, started on amlodipine. Prone to orthostasis due to Parkinsons and advanced age, debilitation. Prepared to discharge home on Sat 8/26/17 with home services.      PHYSICAL EXAM:   General: Patient is alert, pleasant elderly female, no distress.    Vitals: Reviewed.  HEENT: Head is NCAT. Eyes show no injection or icterus. Nares negative. Oropharynx well hydrated.  Neck: Supple. No tenderness or adenopathy. No JVD.  Lungs: Clear bilaterally. No wheezes.  Cardiovascular: Regular rate and rhythm, normal S1, S2.  Abdomen: Soft, no tenderness on exam. Bowel sounds present. No guarding rebound or rigidity.  Extremities: No edema is noted.  Musculoskeletal: Age related degen changes.  Psych: Mood appears good.      MEDICATION LIST:  Current Outpatient Prescriptions   Medication Sig     carbidopa-levodopa (SINEMET CR)  mg per tablet Take 0.5 tablets by mouth 2 (two) times a day.      carbidopa-levodopa (SINEMET CR)  mg per tablet Take 0.5 tablets by mouth 2 (two) times a day. AM and HS     carbidopa-levodopa (SINEMET)  mg per tablet Take 2 tablets by mouth 2 (two) times a day. Take around noon and 6pm     carbidopa-levodopa (SINEMET)  mg per tablet Take 2 tablets by mouth 2 (two) times a day. 1200 and 1800     cholecalciferol, vitamin D3, 5,000 unit Tab Take 1 tablet by mouth daily.     cholecalciferol, vitamin D3, 5,000 unit Tab Take 5,000 Units by mouth daily.     ferrous sulfate 325 (65 FE) MG tablet Take 1 tablet by mouth daily with breakfast.     ferrous sulfate 325 (65 FE) MG tablet Take 1 tablet by mouth daily with breakfast.     menthol-zinc oxide (CALMOSEPTINE) 0.44-20.6 % Oint ointment Apply 1 application topically 2 (two) times a day.     mirtazapine (REMERON) 45 MG tablet Take 45 mg by mouth bedtime.     mirtazapine (REMERON) 45 MG tablet Take 45 mg by mouth at bedtime.     omeprazole (PRILOSEC) 20 MG capsule Take 1 capsule (20 mg total) by mouth 2 (two) times a day before meals.     omeprazole (PRILOSEC) 20 MG capsule Take 20 mg by mouth 2 (two) times a day before meals.     polyvinyl alcohol (LIQUIFILM TEARS) 1.4 % ophthalmic solution Apply 1 drop to eye as needed for dry eyes.     senna-docusate (SENNOSIDES-DOCUSATE SODIUM)  8.6-50 mg tablet Take 1 tablet by mouth 2 (two) times a day as needed for constipation.     venlafaxine (EFFEXOR) 37.5 MG tablet Take 37.5 mg by mouth every morning.     venlafaxine (EFFEXOR) 37.5 MG tablet Take 37.5 mg by mouth every morning.       DISCHARGE DIAGNOSIS:  1. Parkinsons disease.   2. Diverticulitis.   3. Anemia.   4. Depression.   5. Debility.   6. Dizziness.  7. HTN  8. Dementia.    Total time greater than 30 minutes discharge coordination.      MEDICAL EQUIPMENT NEEDS:  No new needs.    DISCHARGE PLAN/FACE TO FACE:  I certify that services are/were furnished while this patient was under the care of a physician and that a physician or an allowed non-physician practitioner (NPP), had a face-to-face encounter that meets the physician face-to-face encounter requirements. The encounter was in whole, or in part, related to the primary reason for home health. The patient is confined to his/her home and needs intermittent skilled nursing, physical therapy, speech-language pathology, or the continued need for occupational therapy. A plan of care has been established by a physician and is periodically reviewed by a physician.    Date of Face-to-Face Encounter: 8/24/17.    I certify that, based on my findings, the following services are medically necessary home health services: PT, OT, HHA. RN.    My clinical findings support the need for the above skilled services because: Generalized weakness and debilitation. Parkinsons and gait instability, HTN with medication changes.    This patient is homebound because: Too strenuous to leave the home.    The patient is, or has been, under my care and I have initiated the establishment of the plan of care. This patient will be followed by a physician who will periodically review the plan of care.      Electronically signed by: Ngoc Herman MD

## 2021-06-12 NOTE — PROGRESS NOTES
[facility]    Facility:   [facility] [[id number]]   Code Status: FULL CODE       Chief Complaint   Patient presents with     Follow Up     TCU 8/17/17.       HPI:   Kita is a 84 y.o. female with Parkinsons disease who was admitted from the emergency department on July 23, 2017 for diverticulitis. She complained of abdominal pain, increasing somnolence and fatigue for more than one day. She had soft bowel movements per family. Had temperature spike of 102.5 in ER. CT showed: 1.  There is marked thickening and enhancement of the wall of the right colon with stranding in the adjacent fat but sparing of the tip of the cecum. There is less prominent wall thickening elsewhere in the colon. This may represent colitis. Some of the small bowel loops in left side of the abdomen are mildly prominent. 2.  The patient has had a left nephrectomy. She was started on Levaquin and Flagyl and had good resolution.  She continued her Parkinson medication.  She was able to tolerate a normal diet.  She was discharged on Levaquin 500 mg daily for 7 days and metronidazole 500 mg twice daily for 7 days. Overall stabilized and discharged to TCU on 7/26/17 for PT, OT, nursing cares, medical management and monitoring.       Past Medical History:  Past Medical History:   Diagnosis Date     Anemia      Asthma      Dementia      GI bleed      Glaucoma      HTN (hypertension)      Hypotension      OP (osteoporosis)      Parkinson disease      Parkinson's disease      Vitamin D deficiency      Word finding difficulty 2016       Medications:  Current Outpatient Prescriptions   Medication Sig     carbidopa-levodopa (SINEMET CR)  mg per tablet Take 0.5 tablets by mouth 2 (two) times a day.      carbidopa-levodopa (SINEMET CR)  mg per tablet Take 0.5 tablets by mouth 2 (two) times a day. AM and HS     carbidopa-levodopa (SINEMET)  mg per tablet Take 2 tablets by mouth 2 (two) times a  day. Take around noon and 6pm     carbidopa-levodopa (SINEMET)  mg per tablet Take 2 tablets by mouth 2 (two) times a day. 1200 and 1800     cholecalciferol, vitamin D3, 5,000 unit Tab Take 1 tablet by mouth daily.     cholecalciferol, vitamin D3, 5,000 unit Tab Take 5,000 Units by mouth daily.     ferrous sulfate 325 (65 FE) MG tablet Take 1 tablet by mouth daily with breakfast.     ferrous sulfate 325 (65 FE) MG tablet Take 1 tablet by mouth daily with breakfast.     menthol-zinc oxide (CALMOSEPTINE) 0.44-20.6 % Oint ointment Apply 1 application topically 2 (two) times a day.     mirtazapine (REMERON) 45 MG tablet Take 45 mg by mouth bedtime.     mirtazapine (REMERON) 45 MG tablet Take 45 mg by mouth at bedtime.     omeprazole (PRILOSEC) 20 MG capsule Take 1 capsule (20 mg total) by mouth 2 (two) times a day before meals.     omeprazole (PRILOSEC) 20 MG capsule Take 20 mg by mouth 2 (two) times a day before meals.     polyvinyl alcohol (LIQUIFILM TEARS) 1.4 % ophthalmic solution Apply 1 drop to eye as needed for dry eyes.     senna-docusate (SENNOSIDES-DOCUSATE SODIUM) 8.6-50 mg tablet Take 1 tablet by mouth 2 (two) times a day as needed for constipation.     venlafaxine (EFFEXOR) 37.5 MG tablet Take 37.5 mg by mouth every morning.     venlafaxine (EFFEXOR) 37.5 MG tablet Take 37.5 mg by mouth every morning.       Subjective:  Diverticulitis is resolved, done with antibiotics, no abdominal pain, cramping or nausea. Dizziness improved with prn Meclizine which had taken at home. Her BPs are running high, started amlodipine and will follow. No headache, shortness of breath or chest pain. No change in vision. Has diagnosis of HTN but not on meds at time of TCU admission. No fever, cough or congestion. Feels stronger, not ready to return home yet though.      Physical Exam:   General: Patient is alert, pleasant elderly female, no distress.   Vitals: /68, Temp 98.1, Pulse 81, RR 16, O2 sat 94% RA.  HEENT:  Head is NCAT. Eyes show no injection or icterus. Nares negative. Oropharynx well hydrated.  Neck: Supple. No tenderness or adenopathy. No JVD.  Lungs: Clear bilaterally. No wheezes.  Cardiovascular: Regular rate and rhythm, normal S1, S2.  Abdomen: Soft, no tenderness on exam. Bowel sounds present. No guarding rebound or rigidity.  Extremities: No edema is noted.  Musculoskeletal: Age related degen changes.  Psych: Mood appears good.      Labs:  Component      Latest Ref Rng & Units 7/23/2017 7/24/2017 7/24/2017 7/24/2017            5:32 AM  9:26 AM  6:36 PM   WBC      4.0 - 11.0 thou/uL 16.0 (H) 16.2 (H) 17.3 (H)    RBC      3.80 - 5.40 mill/uL 3.57 (L) 3.70 (L) 3.62 (L)    Hemoglobin      12.0 - 16.0 g/dL 10.1 (L) 10.2 (L) 10.1 (L) 9.1 (L)   Hematocrit      35.0 - 47.0 % 30.7 (L) 33.0 (L) 31.3 (L)    MCV      80 - 100 fL 86 89 87    MCH      27.0 - 34.0 pg 28.3 27.6 27.9    MCHC      32.0 - 36.0 g/dL 32.9 30.9 (L) 32.3    RDW      11.0 - 14.5 % 14.7 (H) 14.8 (H) 14.7 (H)    Platelets      140 - 440 thou/uL 171 121 (L) 154      Component      Latest Ref Rng & Units 7/25/2017             WBC      4.0 - 11.0 thou/uL 12.1 (H)   RBC      3.80 - 5.40 mill/uL 3.27 (L)   Hemoglobin      12.0 - 16.0 g/dL 9.0 (L)   Hematocrit      35.0 - 47.0 % 28.1 (L)   MCV      80 - 100 fL 86   MCH      27.0 - 34.0 pg 27.5   MCHC      32.0 - 36.0 g/dL 32.0   RDW      11.0 - 14.5 % 14.6 (H)   Platelets      140 - 440 thou/uL 121 (L)       Component      Latest Ref Rng & Units 7/23/2017/23/2017 7/24/2017 7/25/2017 7/25/2017             5:53 AM  1:59 PM   Sodium      136 - 145 mmol/L 137 141 138    Potassium      3.5 - 5.0 mmol/L 3.4 (L) 3.6 3.3 (L) 3.2 (L)   Chloride      98 - 107 mmol/L 102 107 108 (H)    CO2      22 - 31 mmol/L 25 25 27    Anion Gap, Calculation      5 - 18 mmol/L 10 9 3 (L)    Glucose      70 - 125 mg/dL 163 (H) 120 117    BUN      8 - 28 mg/dL 23 14 9    Creatinine      0.60 - 1.10 mg/dL 0.78 0.69 0.63    Calcium      8.5 -  10.5 mg/dL 8.2 (L) 8.2 (L) 7.7 (L)    GFR MDRD Af Amer      >60 mL/min/1.73m2 >60 >60 >60    GFR MDRD Non Af Amer      >60 mL/min/1.73m2 >60 >60 >60      Component      Latest Ref Rng & Units 7/25/2017          11:11 PM   Sodium      136 - 145 mmol/L    Potassium      3.5 - 5.0 mmol/L 4.4   Chloride      98 - 107 mmol/L    CO2      22 - 31 mmol/L    Anion Gap, Calculation      5 - 18 mmol/L    Glucose      70 - 125 mg/dL    BUN      8 - 28 mg/dL    Creatinine      0.60 - 1.10 mg/dL    Calcium      8.5 - 10.5 mg/dL    GFR MDRD Af Amer      >60 mL/min/1.73m2    GFR MDRD Non Af Amer      >60 mL/min/1.73m2        Assessment/Plan:  1. Parkinsons. Cont Sinemet. Gait instability, uses walker.   2. Debility. Continue working with therapy.  3. HTN. Started Amlodipine. Monitor BPs.  4. Diverticulitis. Sx resolved, done with antibiotics.  5. Dizziness. Improved with prn Meclizine.          Electronically signed by: Ngoc Herman MD

## 2021-06-12 NOTE — PROGRESS NOTES
[facility]    Facility:   [facility] [[id number]]   Code Status: FULL CODE       Chief Complaint   Patient presents with     Follow Up     TCU 8/8/17       HPI:   Kita is a 84 y.o. female with Parkinsons disease who was admitted from the emergency department on July 23, 2017 for diverticulitis. She complained of abdominal pain, increasing somnolence and fatigue for more than one day. She had soft bowel movements per family. Had temperature spike of 102.5 in ER. CT showed: 1.  There is marked thickening and enhancement of the wall of the right colon with stranding in the adjacent fat but sparing of the tip of the cecum. There is less prominent wall thickening elsewhere in the colon. This may represent colitis. Some of the small bowel loops in left side of the abdomen are mildly prominent. 2.  The patient has had a left nephrectomy. She was started on Levaquin and Flagyl and had good resolution.  She continued her Parkinson medication.  She was able to tolerate a normal diet.  She was discharged on Levaquin 500 mg daily for 7 days and metronidazole 500 mg twice daily for 7 days. Overall stabilized and discharged to TCU on 7/26/17 for PT, OT, nursing cares, medical management and monitoring.       Past Medical History:  Past Medical History:   Diagnosis Date     Anemia      Asthma      Dementia      GI bleed      Glaucoma      HTN (hypertension)      Hypotension      OP (osteoporosis)      Parkinson disease      Parkinson's disease      Vitamin D deficiency      Word finding difficulty 2016       Medications:  Current Outpatient Prescriptions   Medication Sig     carbidopa-levodopa (SINEMET CR)  mg per tablet Take 0.5 tablets by mouth 2 (two) times a day.      carbidopa-levodopa (SINEMET CR)  mg per tablet Take 0.5 tablets by mouth 2 (two) times a day. AM and HS     carbidopa-levodopa (SINEMET)  mg per tablet Take 2 tablets by mouth 2 (two) times a  day. Take around noon and 6pm     carbidopa-levodopa (SINEMET)  mg per tablet Take 2 tablets by mouth 2 (two) times a day. 1200 and 1800     cholecalciferol, vitamin D3, 5,000 unit Tab Take 1 tablet by mouth daily.     cholecalciferol, vitamin D3, 5,000 unit Tab Take 5,000 Units by mouth daily.     ferrous sulfate 325 (65 FE) MG tablet Take 1 tablet by mouth daily with breakfast.     ferrous sulfate 325 (65 FE) MG tablet Take 1 tablet by mouth daily with breakfast.     menthol-zinc oxide (CALMOSEPTINE) 0.44-20.6 % Oint ointment Apply 1 application topically 2 (two) times a day.     mirtazapine (REMERON) 45 MG tablet Take 45 mg by mouth bedtime.     mirtazapine (REMERON) 45 MG tablet Take 45 mg by mouth at bedtime.     omeprazole (PRILOSEC) 20 MG capsule Take 1 capsule (20 mg total) by mouth 2 (two) times a day before meals.     omeprazole (PRILOSEC) 20 MG capsule Take 20 mg by mouth 2 (two) times a day before meals.     polyvinyl alcohol (LIQUIFILM TEARS) 1.4 % ophthalmic solution Apply 1 drop to eye as needed for dry eyes.     senna-docusate (SENNOSIDES-DOCUSATE SODIUM) 8.6-50 mg tablet Take 1 tablet by mouth 2 (two) times a day as needed for constipation.     venlafaxine (EFFEXOR) 37.5 MG tablet Take 37.5 mg by mouth every morning.     venlafaxine (EFFEXOR) 37.5 MG tablet Take 37.5 mg by mouth every morning.       Subjective:  No complaints of abdominal pain, cramping or diarrhea. In fact has now been somewhat constipated per her report. No fever. Done with Flagyl and Levaquin. Complains of dizziness, chronic problem. Had previously been on meclizine at home. Requests to try here. She has been limited in therapy secondary to some dizziness but overall doing well and getting stronger. She plans to return home where she lives with her son. BPs borderline to elevated at times, not on BP meds. No shortness of breath or chest pain. Appetite is better. No urinary sx.       Physical Exam:   General: Patient is  alert, pleasant elderly female, no distress.   Vitals: /80, Temp 97.4, Pulse 82, RR 18, O2 sat 93% RA.  HEENT: Head is NCAT. Eyes show no injection or icterus. Nares negative. Oropharynx well hydrated.  Neck: Supple. No tenderness or adenopathy. No JVD.  Lungs: Clear bilaterally. No wheezes.  Cardiovascular: Regular rate and rhythm, normal S1, S2.  Back: No spinal or CVA tenderness.  Abdomen: Soft, no tenderness on exam. Bowel sounds present. No guarding rebound or rigidity.  Extremities: No edema is noted.  Musculoskeletal: Age related degen changes.  Psych: Mood appears good.      Labs:  Component      Latest Ref Rng & Units 7/23/2017 7/24/2017 7/24/2017 7/24/2017            5:32 AM  9:26 AM  6:36 PM   WBC      4.0 - 11.0 thou/uL 16.0 (H) 16.2 (H) 17.3 (H)    RBC      3.80 - 5.40 mill/uL 3.57 (L) 3.70 (L) 3.62 (L)    Hemoglobin      12.0 - 16.0 g/dL 10.1 (L) 10.2 (L) 10.1 (L) 9.1 (L)   Hematocrit      35.0 - 47.0 % 30.7 (L) 33.0 (L) 31.3 (L)    MCV      80 - 100 fL 86 89 87    MCH      27.0 - 34.0 pg 28.3 27.6 27.9    MCHC      32.0 - 36.0 g/dL 32.9 30.9 (L) 32.3    RDW      11.0 - 14.5 % 14.7 (H) 14.8 (H) 14.7 (H)    Platelets      140 - 440 thou/uL 171 121 (L) 154      Component      Latest Ref Rng & Units 7/25/2017             WBC      4.0 - 11.0 thou/uL 12.1 (H)   RBC      3.80 - 5.40 mill/uL 3.27 (L)   Hemoglobin      12.0 - 16.0 g/dL 9.0 (L)   Hematocrit      35.0 - 47.0 % 28.1 (L)   MCV      80 - 100 fL 86   MCH      27.0 - 34.0 pg 27.5   MCHC      32.0 - 36.0 g/dL 32.0   RDW      11.0 - 14.5 % 14.6 (H)   Platelets      140 - 440 thou/uL 121 (L)       Component      Latest Ref Rng & Units 7/23/2017 7/24/2017 7/25/2017 7/25/2017             5:53 AM  1:59 PM   Sodium      136 - 145 mmol/L 137 141 138    Potassium      3.5 - 5.0 mmol/L 3.4 (L) 3.6 3.3 (L) 3.2 (L)   Chloride      98 - 107 mmol/L 102 107 108 (H)    CO2      22 - 31 mmol/L 25 25 27    Anion Gap, Calculation      5 - 18 mmol/L 10 9 3 (L)     Glucose      70 - 125 mg/dL 163 (H) 120 117    BUN      8 - 28 mg/dL 23 14 9    Creatinine      0.60 - 1.10 mg/dL 0.78 0.69 0.63    Calcium      8.5 - 10.5 mg/dL 8.2 (L) 8.2 (L) 7.7 (L)    GFR MDRD Af Amer      >60 mL/min/1.73m2 >60 >60 >60    GFR MDRD Non Af Amer      >60 mL/min/1.73m2 >60 >60 >60      Component      Latest Ref Rng & Units 7/25/2017          11:11 PM   Sodium      136 - 145 mmol/L    Potassium      3.5 - 5.0 mmol/L 4.4   Chloride      98 - 107 mmol/L    CO2      22 - 31 mmol/L    Anion Gap, Calculation      5 - 18 mmol/L    Glucose      70 - 125 mg/dL    BUN      8 - 28 mg/dL    Creatinine      0.60 - 1.10 mg/dL    Calcium      8.5 - 10.5 mg/dL    GFR MDRD Af Amer      >60 mL/min/1.73m2    GFR MDRD Non Af Amer      >60 mL/min/1.73m2        Assessment/Plan:  1. Dizziness. Chronic intermittent. Question component of orthostatic hypotension related to autonomic instability with Parkinsons but she states had Meclizine at home and worked well for her. Will order Meclizine prn.  2. Parkinsons. Cont Sinemet.  3. Diverticulitis. Resolved with conservative management with antibiotics.  4. HTN. Cont to monitor BPs, may consider medication. Risk of falls so try to avoid hypotension.  5. Anemia.         Electronically signed by: Ngoc Herman MD

## 2021-06-13 NOTE — PROGRESS NOTES
[facility]    Facility:   [facility] [place] [[id number]]   Code Status: FULL CODE     Chief Complaint   Patient presents with     Follow Up     TCU 10/26/17.       HPI:   Kita is a 85 y.o. female with Parkinsons disease, anemia, asthma, dementia, GI bleed, glaucoma, hypertension, and word finding difficulty who was admitted to the hospital in July 2017 due to diverticulitis with associated fever. She was treated with Levaquin and flagyl, discharged to TCU, spent time in TCU for rehab and ultimately discharged home around 8/26/17.     She presented to the ED on 9/16/17 due to recurrent abdominal pain and fever. Per the ED note:  Patient is nontoxic and well-appearing.  Lab work in the emergency department appear stable and essentially unchanged.  CT scan however shows remaining thickened colon in the same area as the patient had previously.  Previously the patient had been treated with antibiotics which had improved her symptoms.  Patient remains however at [facility] because of general failure to thrive and some weakness.  Radiologist felt that the findings of the CT scan appeared to be most likely related to malignancy.  Patient will need surgical follow-up.  At this point she is comfortable with no continued abdominal pain.  She is well-appearing.  She is nontoxic.  I believe rather than admitted in the hospital for surgery consult patient may be able to restart antibiotics and follow-up with surgery through a consult at the nursing home.  This will be written as an outpatient.  I do lengthy conversation with the patient and her son and they understand that the patient would need to return to the emergency department should she have worsening pain or changes in her symptoms.       Past Medical History:  Past Medical History:   Diagnosis Date     Anemia      Anxiety      Asthma      Dementia      Depression      Diverticulitis      GI bleed      Glaucoma      HTN  (hypertension)      Hypotension      Idiopathic neuropathy      OP (osteoporosis)      Parkinson's disease      Skin cancer, basal cell      Vitamin D deficiency      Word finding difficulty 2016       Medications:  Current Outpatient Prescriptions   Medication Sig     acetaminophen (TYLENOL) 325 MG tablet Take 650 mg by mouth 4 (four) times a day as needed for pain.     amLODIPine (NORVASC) 2.5 MG tablet Take 2.5 mg by mouth daily.     carbidopa-levodopa (SINEMET)  mg per tablet Take 2 tablets by mouth 2 (two) times a day.      carbidopa-levodopa-entacapone (STALEVO) -200 mg per tablet Take 0.5 tablets by mouth 2 (two) times a day.     cholecalciferol, vitamin D3, 5,000 unit Tab Take 1 tablet by mouth daily.     ferrous sulfate 325 (65 FE) MG tablet Take 1 tablet by mouth 2 (two) times a day.      meclizine (ANTIVERT) 12.5 mg tablet Take 12.5 mg by mouth daily.     mirtazapine (REMERON) 45 MG tablet Take 45 mg by mouth bedtime.     omeprazole (PRILOSEC) 20 MG capsule Take 20 mg by mouth 2 (two) times a day before meals.     polyethylene glycol (MIRALAX) 17 gram packet Take 17 g by mouth daily.     senna (SENOKOT) 8.6 mg tablet Take 1 tablet by mouth 2 (two) times a day as needed for constipation.     venlafaxine (EFFEXOR) 37.5 MG tablet Take 37.5 mg by mouth daily.        Subjective:  She seems weaker today, laying in bed which isn't unusual but less interactive and even slower than typical. She did smile, stated she was not having any pain. Waved goodbye after the visit. No complaints of dizziness. She is not doing therapy anymore. Appetite is poor. Reportedly normal BMs. Denies shortness of breath or chest pain. Disposition unclear but she needs increasing amount of assistance and LTC is recommended.       Physical Exam:   General: Patient is a thin, alert, pleasant elderly female, no distress.   Vitals: /75, Temp 98.4, Pulse 76, RR 16, O2 sat 93%RA.  HEENT: Head is NCAT. Eyes show no injection  or icterus. Nares negative. Oropharynx well hydrated.  Lungs: Clear bilaterally. No wheezes.  Cardiovascular: Regular rate and rhythm, normal S1, S2.  Abdomen: Soft, no tenderness on exam. Firmness RLQ.  Extremities: No edema is noted.  Musculoskeletal: Age related degen changes.   Psych: Mood appears good.      Labs:  Component      Latest Ref Rng & Units 9/16/2017 9/21/2017 9/25/2017          ED visit TCU TCU   Pathology, Smear Review      (none)      WBC      4.0 - 11.0 thou/uL 12.3 (H)  10.3   RBC      3.80 - 5.40 mill/uL 3.39 (L)  3.85   Hemoglobin      12.0 - 16.0 g/dL 9.0 (L) 10.3 (L) 9.9 (L)   Hematocrit      35.0 - 47.0 % 27.7 (L)  32.7 (L)   MCV      80 - 100 fL 82  85   MCH      27.0 - 34.0 pg 26.5 (L)  25.7 (L)   MCHC      32.0 - 36.0 g/dL 32.5  30.3 (L)   RDW      11.0 - 14.5 % 15.0 (H)  15.4 (H)   Platelets      140 - 440 thou/uL 209  285   MPV      8.5 - 12.5 fL 9.8  10.6     Component      Latest Ref Rng & Units 9/16/2017          ED   Sodium      136 - 145 mmol/L 136   Potassium      3.5 - 5.0 mmol/L 3.2 (L)   Chloride      98 - 107 mmol/L 99   CO2      22 - 31 mmol/L 26   Anion Gap, Calculation      5 - 18 mmol/L 11   Glucose      70 - 125 mg/dL 108   BUN      8 - 28 mg/dL 20   Creatinine      0.60 - 1.10 mg/dL 0.65   Calcium      8.5 - 10.5 mg/dL 8.3 (L)   Bilirubin, Total      0.0 - 1.0 mg/dL 0.3   Protein, Total      6.0 - 8.0 g/dL 5.7 (L)   ALBUMIN      3.5 - 5.0 g/dL 2.6 (L)   Alkaline Phosphatase      45 - 120 U/L 79   AST      0 - 40 U/L 10   ALT      0 - 45 U/L <6   GFR MDRD Af Amer      >60 mL/min/1.73m2 >60   GFR MDRD Non Af Amer      >60 mL/min/1.73m2 >60       Assessment/Plan:  1. Debilitation. Weakness, advanced age, frailty, mobility issues, etc.   2. Parkinson's. Continue on sinemet.  3. Diverticulitis. Two recent episodes and evidence of abnormal CT for which she saw surgeon. Concern for colon cancer. Follow up again with surgeon.  4. HTN. She is on Amlodipine. Overall  acceptable.    Disposition is unclear, likely may stay LTC.       Electronically signed by: Ngoc Herman MD

## 2021-06-13 NOTE — PROGRESS NOTES
[facility]    Facility:   [facility] [[id number]]   Code Status: UNKNOWN      CHIEF COMPLAINT/REASON FOR VISIT:  Chief Complaint   Patient presents with     Problem Visit     asked to see       HISTORY:      HPI: Kita is a 85 y.o. female who I was asked to see secondary to her complex medical history and her hospitalizations.  The hospitalization July 23 to the 26 2017 was secondary to her abdominal pain diverticulitis was placed on Levaquin and Flagyl and had good resolution of her symptoms.  She then spent some time in the transitional care unit and was discharged.  Most recent hospitalization was September 16, 2017 secondary to abdominal pain.  The CT did show left thickening of the circumferential wall extending from the cecum through the mid ascending and roughly a 10 cm segment.  Also surrounding nodes 9 x 10 mm medially.  Degenerative changes within the spine along with a history of hip prosthetics.  Once again was placed on Flagyl which will and September 26 and the metronidazole finishing up September 23.  There is a concern regarding malignancy of the last CAT scan is certainly she can follow-up.  She also does have anemia hemoglobin 9.0 on September 16 we will recheck that again later on this week on the 21st.  She does have generalized weakness and debility along with a history of Parkinson's asthma hypertension and cognitive impairment.    Past Medical History:   Diagnosis Date     Anemia      Asthma      Dementia      GI bleed      Glaucoma      HTN (hypertension)      Hypotension      OP (osteoporosis)      Parkinson disease      Parkinson's disease      Vitamin D deficiency      Word finding difficulty 2016             Family History   Problem Relation Age of Onset     Parkinsonism Paternal Uncle      Heart disease Mother      Heart attack Father      Pneumonia Brother      Heart murmur Daughter      Diabetes Son      No Medical Problems Son       Social History     Social History     Marital status:      Spouse name: N/A     Number of children: N/A     Years of education: N/A     Social History Main Topics     Smoking status: Former Smoker     Smokeless tobacco: Never Used     Alcohol use No     Drug use: No     Sexual activity: No     Other Topics Concern     Not on file     Social History Narrative    ** Merged History Encounter **         ** Data from: 7/10/17 Enc Dept: [facility]    Her son lives with her in a single family home         ** Data from: 7/23/17 Enc Dept: [facility]    Lives with son         Review of Systems  Currently no reports of fever chills fatigue cough or cold flulike symptoms nausea vomiting diarrhea dysuria rashes sore stiff neck or swollen glands.  History of hypertension asthma Parkinson's dementia left nephrectomy diverticulitis spine degenerative disc disease  Current Outpatient Prescriptions   Medication Sig     carbidopa-levodopa (SINEMET CR)  mg per tablet Take 0.5 tablets by mouth 2 (two) times a day.      carbidopa-levodopa (SINEMET CR)  mg per tablet Take 0.5 tablets by mouth 2 (two) times a day. AM and HS     carbidopa-levodopa (SINEMET)  mg per tablet Take 2 tablets by mouth 2 (two) times a day. Take around noon and 6pm     carbidopa-levodopa (SINEMET)  mg per tablet Take 2 tablets by mouth 2 (two) times a day. 1200 and 1800     cholecalciferol, vitamin D3, 5,000 unit Tab Take 1 tablet by mouth daily.     cholecalciferol, vitamin D3, 5,000 unit Tab Take 5,000 Units by mouth daily.     ferrous sulfate 325 (65 FE) MG tablet Take 1 tablet by mouth daily with breakfast.     ferrous sulfate 325 (65 FE) MG tablet Take 1 tablet by mouth daily with breakfast.     levoFLOXacin (LEVAQUIN) 500 MG tablet Take 1 tablet (500 mg total) by mouth daily for 10 days.     menthol-zinc oxide (CALMOSEPTINE) 0.44-20.6 % Oint ointment Apply 1 application topically 2 (two) times a day.      metroNIDAZOLE (FLAGYL) 500 MG tablet Take 1 tablet (500 mg total) by mouth 2 (two) times a day for 7 days.     mirtazapine (REMERON) 45 MG tablet Take 45 mg by mouth bedtime.     mirtazapine (REMERON) 45 MG tablet Take 45 mg by mouth at bedtime.     omeprazole (PRILOSEC) 20 MG capsule Take 1 capsule (20 mg total) by mouth 2 (two) times a day before meals.     omeprazole (PRILOSEC) 20 MG capsule Take 20 mg by mouth 2 (two) times a day before meals.     polyvinyl alcohol (LIQUIFILM TEARS) 1.4 % ophthalmic solution Apply 1 drop to eye as needed for dry eyes.     senna-docusate (SENNOSIDES-DOCUSATE SODIUM) 8.6-50 mg tablet Take 1 tablet by mouth 2 (two) times a day as needed for constipation.     venlafaxine (EFFEXOR) 37.5 MG tablet Take 37.5 mg by mouth every morning.     venlafaxine (EFFEXOR) 37.5 MG tablet Take 37.5 mg by mouth every morning.       .There were no vitals filed for this visit.  Blood pressure 144/79 pulse 80 respirations 16  Physical Exam   HENT:   Head: Normocephalic.   Eyes: Pupils are equal, round, and reactive to light.   Glasses   Neck: Neck supple. No thyromegaly present.   Cardiovascular: Normal rate, regular rhythm and normal heart sounds.    Pulmonary/Chest: Breath sounds normal.   Abdominal: Bowel sounds are normal. There is no tenderness. There is no guarding.   Musculoskeletal:   Parkinsonian tremors.  Right foot drop.  AFO right foot   Lymphadenopathy:     She has no cervical adenopathy.   Neurological: She is alert.   Skin: Skin is warm and dry. No rash noted.         LABS:   Lab Results   Component Value Date    WBC 12.3 (H) 09/16/2017    HGB 9.0 (L) 09/16/2017    HCT 27.7 (L) 09/16/2017    MCV 82 09/16/2017     09/16/2017     Results for orders placed or performed during the hospital encounter of 07/23/17   Basic Metabolic Panel   Result Value Ref Range    Sodium 137 136 - 145 mmol/L    Potassium 3.4 (L) 3.5 - 5.0 mmol/L    Chloride 102 98 - 107 mmol/L    CO2 25 22 - 31  mmol/L    Anion Gap, Calculation 10 5 - 18 mmol/L    Glucose 163 (H) 70 - 125 mg/dL    Calcium 8.2 (L) 8.5 - 10.5 mg/dL    BUN 23 8 - 28 mg/dL    Creatinine 0.78 0.60 - 1.10 mg/dL    GFR MDRD Af Amer >60 >60 mL/min/1.73m2    GFR MDRD Non Af Amer >60 >60 mL/min/1.73m2     Lab Results   Component Value Date    ALT <6 09/16/2017    AST 10 09/16/2017    ALKPHOS 79 09/16/2017    BILITOT 0.3 09/16/2017         ASSESSMENT:      ICD-10-CM    1. Acute diverticulitis K57.92    2. Parkinson's disease G20    3. Essential hypertension I10    4. General weakness R53.1        PLAN:    We will have dietary get involved regarding discussion and information regarding dietary choices and modification secondary to the diverticulitis along with adding a hemoglobin on September 21.  Continue to work with therapy and do some standing and various exercises.  She also has a small open area in the coccyx and there can use a Mepilex on that.  Also encourage incentive spirometry at least every couple hours as needed.  Also evaluate and treat with speech therapy.    For documentation purposes total visit was over 40 minutes to which over 50% was spent with the patient going over her most recent hospitalizations her history of diverticulitis as well as her stay on the transitional care unit and coordination of care efforts.        Electronically signed by: Michael Duane Johnson, CNP

## 2021-06-13 NOTE — PROGRESS NOTES
Kita Hills is a 85 y.o. year old female with a several month history of right mid abdominal and right lower quadrant pain with associated weakness and loose bowel movements.  She was evaluated in the emergency room on several occasions for similar complaints and underwent multiple CT scans which demonstrated thickening of the cecum and ascending colon.  Started on antibiotics and completed this course on 2 separate counts and states that her pain is slightly improved but still persists.  She denies any bloody bowel movements but has decreased appetite.  She is wheelchair-bound and has Parkinson's as well.    Allergies:Crestor [rosuvastatin]; Paxil [paroxetine hcl]; Penicillins; Pramipexole; Statins-hmg-coa reductase inhibitors; and Zetia [ezetimibe]    Past Medical History:   Diagnosis Date     Anemia      Anxiety      Asthma      Dementia      Depression      Diverticulitis      GI bleed      Glaucoma      HTN (hypertension)      Hypotension      Idiopathic neuropathy      OP (osteoporosis)      Parkinson's disease      Skin cancer, basal cell      Vitamin D deficiency      Word finding difficulty 2016       Past Surgical History:   Procedure Laterality Date     ESOPHAGOGASTRODUODENOSCOPY N/A 2/27/2017    Procedure: ESOPHAGOGASTRODUODENOSCOPY (EGD) with APC cautery usage;  Surgeon: Kelton Sr MD;  Location: [facility];  Service:      HIP ARTHROPLASTY Right      HIP ARTHROPLASTY      left and right full     HIP SURGERY Left      HYSTERECTOMY       KNEE ARTHROSCOPY      right     KNEE SURGERY Right      NEPHRECTOMY         CURRENT MEDS:    Current Outpatient Prescriptions:      acetaminophen (TYLENOL) 325 MG tablet, Take 650 mg by mouth 4 (four) times a day as needed for pain., Disp: , Rfl:      amLODIPine (NORVASC) 2.5 MG tablet, Take 2.5 mg by mouth daily., Disp: , Rfl:      carbidopa-levodopa (SINEMET)  mg per tablet, Take 2 tablets by mouth 2 (two) times a day. , Disp: , Rfl:       carbidopa-levodopa-entacapone (STALEVO) -200 mg per tablet, Take 0.5 tablets by mouth 2 (two) times a day., Disp: , Rfl:      cholecalciferol, vitamin D3, 5,000 unit Tab, Take 1 tablet by mouth daily., Disp: , Rfl:      ferrous sulfate 325 (65 FE) MG tablet, Take 1 tablet by mouth 2 (two) times a day. , Disp: , Rfl:      levoFLOXacin (LEVAQUIN) 500 MG tablet, Take 1 tablet (500 mg total) by mouth daily for 10 days., Disp: 10 tablet, Rfl: 0     meclizine (ANTIVERT) 12.5 mg tablet, Take 12.5 mg by mouth daily., Disp: , Rfl:      mirtazapine (REMERON) 45 MG tablet, Take 45 mg by mouth bedtime., Disp: , Rfl:      omeprazole (PRILOSEC) 20 MG capsule, Take 20 mg by mouth 2 (two) times a day before meals., Disp: , Rfl:      polyethylene glycol (MIRALAX) 17 gram packet, Take 17 g by mouth daily., Disp: , Rfl:      prednisoLONE acetate (PRED-FORTE) 1 % ophthalmic suspension, Administer 1 drop to the right eye 4 (four) times a day., Disp: , Rfl:      senna (SENOKOT) 8.6 mg tablet, Take 1 tablet by mouth 2 (two) times a day as needed for constipation., Disp: , Rfl:      venlafaxine (EFFEXOR) 37.5 MG tablet, Take 37.5 mg by mouth daily. , Disp: , Rfl:     Family History   Problem Relation Age of Onset     Parkinsonism Paternal Uncle      Heart disease Mother      Heart attack Father      Pneumonia Brother      Heart murmur Daughter      Diabetes Son      No Medical Problems Son         reports that she has never smoked. She has never used smokeless tobacco. She reports that she does not drink alcohol or use illicit drugs.    Review of Systems:  The 12 system review is within normal limits except for as mentioned above.  General ROS: No complaints or constitutional symptoms  Ophthalmic ROS: No complaints of visual changes  Skin: No complaints or symptoms   Endocrine: No complaints or symptoms  Hematologic/Lymphatic: No symptoms or complaints  Psychiatric: No symptoms or complaints  Respiratory ROS: no cough, shortness of  "breath, or wheezing  Cardiovascular ROS: no chest pain or dyspnea on exertion  Gastrointestinal ROS: As per HPI  Genito-Urinary ROS: no dysuria, trouble voiding, or hematuria  Musculoskeletal ROS: no joint or muscle pain  Neurological ROS: no TIA or stroke symptoms      EXAM:  /60 (Patient Site: Left Arm, Patient Position: Sitting, Cuff Size: Adult Regular)  Pulse 82  Ht 5' 6\" (1.676 m)  Wt 121 lb 8 oz (55.1 kg)  LMP  (LMP Unknown)  SpO2 98%  Breastfeeding? No  BMI 19.61 kg/m2  GENERAL: No acute distress, sitting in wheelchair, cachectic  EYES: Pupils equal, round and reactive, no scleral icterus  CARDIAC: Regular rate and rhythm, no obvious murmurs noted  CHEST/LUNG: Clear to ascultation bilaterally, No ronchi, No wheezes  ABDOMEN: Soft, tender to palpation her right mid abdomen and right lower quadrant with palpable firm nodular a sending colon  SKIN: Pink, warm and dry, no obvious rashes or lesions   NEURO:No focal deficits, ambulatory  MUSCULOSKELETAL:No obvious deformities, no swelling, normal appearing      LABS:  Lab Results   Component Value Date    WBC 12.3 (H) 09/16/2017    HGB 10.3 (L) 09/21/2017    HCT 27.7 (L) 09/16/2017    MCV 82 09/16/2017     09/16/2017     INR/Prothrombin Time      Lab Results   Component Value Date    ALT <6 09/16/2017    AST 10 09/16/2017    ALKPHOS 79 09/16/2017    BILITOT 0.3 09/16/2017       IMAGES:   Relevant images were reviewed and discussed with the patient.  Notable findings were     CT ABDOMEN PELVIS WO ORAL WO IV CONTRAST  9/16/2017 9:12 PM     INDICATION: Abdominal pain.  TECHNIQUE: Routine CT abdomen and pelvis without oral or IV contrast. Multiplanar reformation images (MPR). Dose reduction techniques were used.  COMPARISON: 7/23/2017.     FINDINGS:  LUNG BASES: Mild scarring and bronchiolectasis.     ABDOMEN: Left nephrectomy. Unremarkable noncontrast right kidney, liver, pancreas, spleen and adrenals. Right renal cysts. Tortuous aorta with " scattered plaque. No free air or adenopathy.     PELVIS: Progressive marked circumferential wall thickening extending from the cecum through the mid ascending; roughly 10 cm segment; example series 2, image 50). Mild surrounding stranding. No focal collection or free air. Small to borderline prominent   surrounding nodes. 9 x 10 mm medially (series 2, image 45). Small bowel is normal in caliber.     MUSCULOSKELETAL: Bony demineralization. Degenerative changes spine. Hip prosthetics.     IMPRESSION:   CONCLUSION:     1.  Progressive marked circumferential wall thickening involving the cecum and mid ascending colon, at this point malignant until proven otherwise. Atypical colitis as a differential. No collections or free air.     2.  Small to borderline prominent surrounding lymph nodes may be metastatic if neoplastic.           Assessment/Plan:   Kita Hills is a 85 y.o. female with fairly impressive thickening of the right colon.  My fear is that this is a malignancy with ongoing pericolonic inflammation.  Additionally, on the differential is a chronic colitis.  I had an extensive discussion with her and her son regarding the findings on the CT scan.  I reviewed the CT scans and went over it with them as well.  The ideal situation would be for a colonoscopy for tissue sampling.  However, given her frail nature and age this may not be in her best interest.  I discussed the risks and benefits of colonoscopy followed by surgery versus observation.  It was made very clear that without a tissue sample we will most likely not know what the diagnosis is.  However, we can obtain basic labs including a CEA as a baseline.  The son and the patient are both amenable to observation at this point and she does not want surgery or any other invasive procedures.  I think this is somewhat reasonable given her age and frailty.  In the meantime, I have advised her to present to the emergency room if she notices any worsening  abdominal pain, fevers, chills or any other symptoms that may be pointing towards perforation versus worsening clinical situation.      Dre Whitt D.O. [facility]  [phone]  [facility] Department of Surgery

## 2021-06-13 NOTE — PROGRESS NOTES
[facility]    Facility:   [facility] [[id number]]   Code Status: FULL CODE       Chief Complaint   Patient presents with     Follow Up     TCU 9/28/17.       HPI:   Kita is a 85 y.o. female with Parkinsons disease, anemia, asthma, dementia, GI bleed, glaucoma, hypertension, and word finding difficulty who was admitted to the hospital in July 2017 due to diverticulitis with associated fever. She was treated with Levaquin and flagyl, discharged to TCU, spent time in TCU for rehab and ultimately discharged home around 8/26/17.     She presented to the ED on 9/16/17 due to recurrent abdominal pain and fever. Per the ED note:  Patient is nontoxic and well-appearing.  Lab work in the emergency department appear stable and essentially unchanged.  CT scan however shows remaining thickened colon in the same area as the patient had previously.  Previously the patient had been treated with antibiotics which had improved her symptoms.  Patient remains however at [facility] because of general failure to thrive and some weakness.  Radiologist felt that the findings of the CT scan appeared to be most likely related to malignancy.  Patient will need surgical follow-up.  At this point she is comfortable with no continued abdominal pain.  She is well-appearing.  She is nontoxic.  I believe rather than admitted in the hospital for surgery consult patient may be able to restart antibiotics and follow-up with surgery through a consult at the nursing home.  This will be written as an outpatient.  I do lengthy conversation with the patient and her son and they understand that the patient would need to return to the emergency department should she have worsening pain or changes in her symptoms.       Past Medical History:  Past Medical History:   Diagnosis Date     Anemia      Anxiety      Asthma      Dementia      Depression      Diverticulitis      GI bleed      Glaucoma      HTN  (hypertension)      Hypotension      Idiopathic neuropathy      OP (osteoporosis)      Parkinson's disease      Skin cancer, basal cell      Vitamin D deficiency      Word finding difficulty 2016         Medications:  Current Outpatient Prescriptions   Medication Sig     acetaminophen (TYLENOL) 325 MG tablet Take 650 mg by mouth 4 (four) times a day as needed for pain.     amLODIPine (NORVASC) 2.5 MG tablet Take 2.5 mg by mouth daily.     carbidopa-levodopa (SINEMET)  mg per tablet Take 2 tablets by mouth 2 (two) times a day.      carbidopa-levodopa-entacapone (STALEVO) -200 mg per tablet Take 0.5 tablets by mouth 2 (two) times a day.     cholecalciferol, vitamin D3, 5,000 unit Tab Take 1 tablet by mouth daily.     ferrous sulfate 325 (65 FE) MG tablet Take 1 tablet by mouth 2 (two) times a day.      meclizine (ANTIVERT) 12.5 mg tablet Take 12.5 mg by mouth daily.     mirtazapine (REMERON) 45 MG tablet Take 45 mg by mouth bedtime.     omeprazole (PRILOSEC) 20 MG capsule Take 20 mg by mouth 2 (two) times a day before meals.     polyethylene glycol (MIRALAX) 17 gram packet Take 17 g by mouth daily.     prednisoLONE acetate (PRED-FORTE) 1 % ophthalmic suspension Administer 1 drop to the right eye 4 (four) times a day.     senna (SENOKOT) 8.6 mg tablet Take 1 tablet by mouth 2 (two) times a day as needed for constipation.     venlafaxine (EFFEXOR) 37.5 MG tablet Take 37.5 mg by mouth daily.        Subjective:  She denies abdominal pain. Appetite is poor, on supplements. Normal BMs. No urinary sx. She had consult with surgeon due to abnormal CT results concerning for malignancy but she is unclear on what she is suppose to do. No fever. Denies cough or congestion.        Physical Exam:   General: Patient is a thin, alert, pleasant elderly female, no distress.   Vitals: /77, Temp 97.9, Pulse 82, RR 16, O2 sat 94%RA.  HEENT: Head is NCAT. Eyes show no injection or icterus. Nares negative. Oropharynx well  hydrated.  Lungs: Clear bilaterally. No wheezes.  Cardiovascular: Regular rate and rhythm, normal S1, S2.  Back: No spinal or CVA tenderness.  Abdomen: Soft, no tenderness on exam. Bowel sounds present. No guarding rebound or rigidity.  Extremities: No edema is noted.  Musculoskeletal: Age related degen changes.   Psych: Mood appears good.      Labs:  Results for orders placed or performed during the hospital encounter of 07/23/17   Basic Metabolic Panel   Result Value Ref Range    Sodium 137 136 - 145 mmol/L    Potassium 3.4 (L) 3.5 - 5.0 mmol/L    Chloride 102 98 - 107 mmol/L    CO2 25 22 - 31 mmol/L    Anion Gap, Calculation 10 5 - 18 mmol/L    Glucose 163 (H) 70 - 125 mg/dL    Calcium 8.2 (L) 8.5 - 10.5 mg/dL    BUN 23 8 - 28 mg/dL    Creatinine 0.78 0.60 - 1.10 mg/dL    GFR MDRD Af Amer >60 >60 mL/min/1.73m2    GFR MDRD Non Af Amer >60 >60 mL/min/1.73m2       Assessment/Plan:  1. Diverticulitis. Finish Levaquin and Flagyl. Denies current abdominal pain or fever.   2. Abnormal CT. Concerning for malignancy. Saw surgeon, patient unclear on next steps. Will get office note.  3. Parkinson's disease. Cont home Sinemet.  4. HTN. Cont amlodipine. Monitor BPs.  5. Weakness. Overall frailty.        Electronically signed by: Ngoc Herman MD

## 2021-06-13 NOTE — PROGRESS NOTES
[facility]    Facility:   [facility] [[id number]]   Code Status: FULL CODE      Chief Complaint   Patient presents with     Follow Up     TCU 10/19/17.       HPI:   Kita is a 85 y.o. female with Parkinsons disease, anemia, asthma, dementia, GI bleed, glaucoma, hypertension, and word finding difficulty who was admitted to the hospital in July 2017 due to diverticulitis with associated fever. She was treated with Levaquin and flagyl, discharged to TCU, spent time in TCU for rehab and ultimately discharged home around 8/26/17.     She presented to the ED on 9/16/17 due to recurrent abdominal pain and fever. Per the ED note:  Patient is nontoxic and well-appearing.  Lab work in the emergency department appear stable and essentially unchanged.  CT scan however shows remaining thickened colon in the same area as the patient had previously.  Previously the patient had been treated with antibiotics which had improved her symptoms.  Patient remains however at [facility] because of general failure to thrive and some weakness.  Radiologist felt that the findings of the CT scan appeared to be most likely related to malignancy.  Patient will need surgical follow-up.  At this point she is comfortable with no continued abdominal pain.  She is well-appearing.  She is nontoxic.  I believe rather than admitted in the hospital for surgery consult patient may be able to restart antibiotics and follow-up with surgery through a consult at the nursing home.  This will be written as an outpatient.  I do lengthy conversation with the patient and her son and they understand that the patient would need to return to the emergency department should she have worsening pain or changes in her symptoms.       Past Medical History:  Past Medical History:   Diagnosis Date     Anemia      Anxiety      Asthma      Dementia      Depression      Diverticulitis      GI bleed      Glaucoma      HTN  (hypertension)      Hypotension      Idiopathic neuropathy      OP (osteoporosis)      Parkinson's disease      Skin cancer, basal cell      Vitamin D deficiency      Word finding difficulty 2016       Medications:  Current Outpatient Prescriptions   Medication Sig     acetaminophen (TYLENOL) 325 MG tablet Take 650 mg by mouth 4 (four) times a day as needed for pain.     amLODIPine (NORVASC) 2.5 MG tablet Take 2.5 mg by mouth daily.     carbidopa-levodopa (SINEMET)  mg per tablet Take 2 tablets by mouth 2 (two) times a day.      carbidopa-levodopa-entacapone (STALEVO) -200 mg per tablet Take 0.5 tablets by mouth 2 (two) times a day.     cholecalciferol, vitamin D3, 5,000 unit Tab Take 1 tablet by mouth daily.     ferrous sulfate 325 (65 FE) MG tablet Take 1 tablet by mouth 2 (two) times a day.      meclizine (ANTIVERT) 12.5 mg tablet Take 12.5 mg by mouth daily.     mirtazapine (REMERON) 45 MG tablet Take 45 mg by mouth bedtime.     omeprazole (PRILOSEC) 20 MG capsule Take 20 mg by mouth 2 (two) times a day before meals.     polyethylene glycol (MIRALAX) 17 gram packet Take 17 g by mouth daily.     senna (SENOKOT) 8.6 mg tablet Take 1 tablet by mouth 2 (two) times a day as needed for constipation.     venlafaxine (EFFEXOR) 37.5 MG tablet Take 37.5 mg by mouth daily.        Subjective:  No interim concerns. Done with therapy. Unclear disposition. Previously lived with her son. LTC recommended. No clinical concerns. Dizziness well controlled on one dose meclizine daily. BPs reviewed. She denies abdominal pain. Appetite is fair. Normal BMs. No urinary sx. She had consult with surgeon due to abnormal CT results concerning for malignancy, will have additional follow up later. No fever. Denies cough or congestion.        Physical Exam:   General: Patient is a thin, alert, pleasant elderly female, no distress.   Vitals: /73, Temp 97.9, Pulse 87, RR 18, O2 sat 9%RA.  HEENT: Head is NCAT. Eyes show no  injection or icterus. Nares negative. Oropharynx well hydrated.  Lungs: Clear bilaterally. No wheezes.  Cardiovascular: Regular rate and rhythm, normal S1, S2.  Abdomen: Soft, no tenderness on exam. Bowel sounds present. No guarding rebound or rigidity.  Extremities: No edema is noted.  Musculoskeletal: Age related degen changes.   Psych: Mood appears good.      Labs:  Component      Latest Ref Rng & Units 9/16/2017 9/21/2017 9/25/2017          ED visit TCU TCU   Pathology, Smear Review      (none)      WBC      4.0 - 11.0 thou/uL 12.3 (H)  10.3   RBC      3.80 - 5.40 mill/uL 3.39 (L)  3.85   Hemoglobin      12.0 - 16.0 g/dL 9.0 (L) 10.3 (L) 9.9 (L)   Hematocrit      35.0 - 47.0 % 27.7 (L)  32.7 (L)   MCV      80 - 100 fL 82  85   MCH      27.0 - 34.0 pg 26.5 (L)  25.7 (L)   MCHC      32.0 - 36.0 g/dL 32.5  30.3 (L)   RDW      11.0 - 14.5 % 15.0 (H)  15.4 (H)   Platelets      140 - 440 thou/uL 209  285   MPV      8.5 - 12.5 fL 9.8  10.6     Component      Latest Ref Rng & Units 9/16/2017          ED   Sodium      136 - 145 mmol/L 136   Potassium      3.5 - 5.0 mmol/L 3.2 (L)   Chloride      98 - 107 mmol/L 99   CO2      22 - 31 mmol/L 26   Anion Gap, Calculation      5 - 18 mmol/L 11   Glucose      70 - 125 mg/dL 108   BUN      8 - 28 mg/dL 20   Creatinine      0.60 - 1.10 mg/dL 0.65   Calcium      8.5 - 10.5 mg/dL 8.3 (L)   Bilirubin, Total      0.0 - 1.0 mg/dL 0.3   Protein, Total      6.0 - 8.0 g/dL 5.7 (L)   ALBUMIN      3.5 - 5.0 g/dL 2.6 (L)   Alkaline Phosphatase      45 - 120 U/L 79   AST      0 - 40 U/L 10   ALT      0 - 45 U/L <6   GFR MDRD Af Amer      >60 mL/min/1.73m2 >60   GFR MDRD Non Af Amer      >60 mL/min/1.73m2 >60       Assessment/Plan:  1. Debilitation. Overall weakness and frailty. Unclear disposition, she is done with therapy.  2. Parkinsons. Continue on sinemet.  3. Diverticulitis. Two recent episodes and evidence of abnormal CT for which she saw surgeon. Concern for malignancy. Will have  later follow up.  4. HTN. On Amlodipine. Monitor BPs.         Electronically signed by: Ngoc Herman MD

## 2021-06-13 NOTE — PROGRESS NOTES
[facility]    [facility] [[id number]]   Code Status: FULL CODE       Chief Complaint   Patient presents with     Follow Up     TCU 10/4/17.       HPI:   Kita is a 85 y.o. female with Parkinsons disease, anemia, asthma, dementia, GI bleed, glaucoma, hypertension, and word finding difficulty who was admitted to the hospital in July 2017 due to diverticulitis with associated fever. She was treated with Levaquin and flagyl, discharged to TCU, spent time in TCU for rehab and ultimately discharged home around 8/26/17.     She presented to the ED on 9/16/17 due to recurrent abdominal pain and fever. Per the ED note:  Patient is nontoxic and well-appearing.  Lab work in the emergency department appear stable and essentially unchanged.  CT scan however shows remaining thickened colon in the same area as the patient had previously.  Previously the patient had been treated with antibiotics which had improved her symptoms.  Patient remains however at [facility] because of general failure to thrive and some weakness.  Radiologist felt that the findings of the CT scan appeared to be most likely related to malignancy.  Patient will need surgical follow-up.  At this point she is comfortable with no continued abdominal pain.  She is well-appearing.  She is nontoxic.  I believe rather than admitted in the hospital for surgery consult patient may be able to restart antibiotics and follow-up with surgery through a consult at the nursing home.  This will be written as an outpatient.  I do lengthy conversation with the patient and her son and they understand that the patient would need to return to the emergency department should she have worsening pain or changes in her symptoms.       Past Medical History:  Past Medical History:   Diagnosis Date     Anemia      Anxiety      Asthma      Dementia      Depression      Diverticulitis      GI bleed      Glaucoma      HTN  "(hypertension)      Hypotension      Idiopathic neuropathy      OP (osteoporosis)      Parkinson's disease      Skin cancer, basal cell      Vitamin D deficiency      Word finding difficulty 2016       Medications:  Current Outpatient Prescriptions   Medication Sig     acetaminophen (TYLENOL) 325 MG tablet Take 650 mg by mouth 4 (four) times a day as needed for pain.     amLODIPine (NORVASC) 2.5 MG tablet Take 2.5 mg by mouth daily.     carbidopa-levodopa (SINEMET)  mg per tablet Take 2 tablets by mouth 2 (two) times a day.      carbidopa-levodopa-entacapone (STALEVO) -200 mg per tablet Take 0.5 tablets by mouth 2 (two) times a day.     cholecalciferol, vitamin D3, 5,000 unit Tab Take 1 tablet by mouth daily.     ferrous sulfate 325 (65 FE) MG tablet Take 1 tablet by mouth 2 (two) times a day.      meclizine (ANTIVERT) 12.5 mg tablet Take 12.5 mg by mouth daily.     mirtazapine (REMERON) 45 MG tablet Take 45 mg by mouth bedtime.     omeprazole (PRILOSEC) 20 MG capsule Take 20 mg by mouth 2 (two) times a day before meals.     polyethylene glycol (MIRALAX) 17 gram packet Take 17 g by mouth daily.     prednisoLONE acetate (PRED-FORTE) 1 % ophthalmic suspension Administer 1 drop to the right eye 4 (four) times a day.     senna (SENOKOT) 8.6 mg tablet Take 1 tablet by mouth 2 (two) times a day as needed for constipation.     venlafaxine (EFFEXOR) 37.5 MG tablet Take 37.5 mg by mouth daily.        Subjective:  She is stable. Will be finishing therapy this week. Reports very brief episode of \"dizziness\" during therapy today, not concerning to patient. She does have meclizine available. BPs 108-160 systolic, generally 70s diastolic. She denies abdominal pain. Appetite is fair. Normal BMs. No urinary sx. She had consult with surgeon due to abnormal CT results concerning for malignancy, will have additional follow up later. No fever. Denies cough or congestion.        Physical Exam:   General: Patient is a thin, " alert, pleasant elderly female, no distress.   Vitals: /73, Temp 98.7, Pulse 82, RR 16, O2 sat 97%RA.  HEENT: Head is NCAT. Eyes show no injection or icterus. Nares negative. Oropharynx well hydrated.  Lungs: Clear bilaterally. No wheezes.  Cardiovascular: Regular rate and rhythm, normal S1, S2.  Abdomen: Soft, no tenderness on exam. Bowel sounds present. No guarding rebound or rigidity.  Extremities: No edema is noted.  Musculoskeletal: Age related degen changes.   Psych: Mood appears good.      Labs:  Component      Latest Ref Rng & Units 9/16/2017 9/21/2017 9/25/2017          ED visit TCU TCU   Pathology, Smear Review      (none)      WBC      4.0 - 11.0 thou/uL 12.3 (H)  10.3   RBC      3.80 - 5.40 mill/uL 3.39 (L)  3.85   Hemoglobin      12.0 - 16.0 g/dL 9.0 (L) 10.3 (L) 9.9 (L)   Hematocrit      35.0 - 47.0 % 27.7 (L)  32.7 (L)   MCV      80 - 100 fL 82  85   MCH      27.0 - 34.0 pg 26.5 (L)  25.7 (L)   MCHC      32.0 - 36.0 g/dL 32.5  30.3 (L)   RDW      11.0 - 14.5 % 15.0 (H)  15.4 (H)   Platelets      140 - 440 thou/uL 209  285   MPV      8.5 - 12.5 fL 9.8  10.6     Component      Latest Ref Rng & Units 9/16/2017          ED   Sodium      136 - 145 mmol/L 136   Potassium      3.5 - 5.0 mmol/L 3.2 (L)   Chloride      98 - 107 mmol/L 99   CO2      22 - 31 mmol/L 26   Anion Gap, Calculation      5 - 18 mmol/L 11   Glucose      70 - 125 mg/dL 108   BUN      8 - 28 mg/dL 20   Creatinine      0.60 - 1.10 mg/dL 0.65   Calcium      8.5 - 10.5 mg/dL 8.3 (L)   Bilirubin, Total      0.0 - 1.0 mg/dL 0.3   Protein, Total      6.0 - 8.0 g/dL 5.7 (L)   ALBUMIN      3.5 - 5.0 g/dL 2.6 (L)   Alkaline Phosphatase      45 - 120 U/L 79   AST      0 - 40 U/L 10   ALT      0 - 45 U/L <6   GFR MDRD Af Amer      >60 mL/min/1.73m2 >60   GFR MDRD Non Af Amer      >60 mL/min/1.73m2 >60       Assessment/Plan:  1. Debility. Overall frailty, weakness. Will be done with therapy soon.   2. Diverticulitis. Two recent episodes with  most recent CT eval abnormal, concern for malignancy. Had consult with surgeon, will need later follow p.  3. Parkinson's disease. Cont home Sinemet.  4. HTN. Cont amlodipine. Monitor BPs.         Electronically signed by: Ngoc Herman MD

## 2021-06-13 NOTE — PROGRESS NOTES
[facility]    Facility:   [facility] [[id number]]   Code Status: FULL CODE       Chief Complaint   Patient presents with     Follow Up     TCU 10/12/17.       HPI:   Kita is a 85 y.o. female with Parkinsons disease, anemia, asthma, dementia, GI bleed, glaucoma, hypertension, and word finding difficulty who was admitted to the hospital in July 2017 due to diverticulitis with associated fever. She was treated with Levaquin and flagyl, discharged to TCU, spent time in TCU for rehab and ultimately discharged home around 8/26/17.     She presented to the ED on 9/16/17 due to recurrent abdominal pain and fever. Per the ED note:  Patient is nontoxic and well-appearing.  Lab work in the emergency department appear stable and essentially unchanged.  CT scan however shows remaining thickened colon in the same area as the patient had previously.  Previously the patient had been treated with antibiotics which had improved her symptoms.  Patient remains however at [facility] because of general failure to thrive and some weakness.  Radiologist felt that the findings of the CT scan appeared to be most likely related to malignancy.  Patient will need surgical follow-up.  At this point she is comfortable with no continued abdominal pain.  She is well-appearing.  She is nontoxic.  I believe rather than admitted in the hospital for surgery consult patient may be able to restart antibiotics and follow-up with surgery through a consult at the nursing home.  This will be written as an outpatient.  I do lengthy conversation with the patient and her son and they understand that the patient would need to return to the emergency department should she have worsening pain or changes in her symptoms.       Past Medical History:  Past Medical History:   Diagnosis Date     Anemia      Anxiety      Asthma      Dementia      Depression      Diverticulitis      GI bleed      Glaucoma      HTN  (hypertension)      Hypotension      Idiopathic neuropathy      OP (osteoporosis)      Parkinson's disease      Skin cancer, basal cell      Vitamin D deficiency      Word finding difficulty 2016       Medications:  Current Outpatient Prescriptions   Medication Sig     acetaminophen (TYLENOL) 325 MG tablet Take 650 mg by mouth 4 (four) times a day as needed for pain.     amLODIPine (NORVASC) 2.5 MG tablet Take 2.5 mg by mouth daily.     carbidopa-levodopa (SINEMET)  mg per tablet Take 2 tablets by mouth 2 (two) times a day.      carbidopa-levodopa-entacapone (STALEVO) -200 mg per tablet Take 0.5 tablets by mouth 2 (two) times a day.     cholecalciferol, vitamin D3, 5,000 unit Tab Take 1 tablet by mouth daily.     ferrous sulfate 325 (65 FE) MG tablet Take 1 tablet by mouth 2 (two) times a day.      meclizine (ANTIVERT) 12.5 mg tablet Take 12.5 mg by mouth daily.     mirtazapine (REMERON) 45 MG tablet Take 45 mg by mouth bedtime.     omeprazole (PRILOSEC) 20 MG capsule Take 20 mg by mouth 2 (two) times a day before meals.     polyethylene glycol (MIRALAX) 17 gram packet Take 17 g by mouth daily.     senna (SENOKOT) 8.6 mg tablet Take 1 tablet by mouth 2 (two) times a day as needed for constipation.     venlafaxine (EFFEXOR) 37.5 MG tablet Take 37.5 mg by mouth daily.        Subjective:  Done with therapy. Waiting for family to decide. LTC recommended. No clinical concerns. Dizziness well controlled on one dose meclizine daily. BPs reviewed. She denies abdominal pain. Appetite is fair. Normal BMs. No urinary sx. She had consult with surgeon due to abnormal CT results concerning for malignancy, will have additional follow up later. No fever. Denies cough or congestion.        Physical Exam:   General: Patient is a thin, alert, pleasant elderly female, no distress.   Vitals: Reviewed.   HEENT: Head is NCAT. Eyes show no injection or icterus. Nares negative. Oropharynx well hydrated.  Lungs: Clear  bilaterally. No wheezes.  Cardiovascular: Regular rate and rhythm, normal S1, S2.  Abdomen: Soft, no tenderness on exam. Bowel sounds present. No guarding rebound or rigidity.  Extremities: No edema is noted.  Musculoskeletal: Age related degen changes.   Psych: Mood appears good.      Labs:  Component      Latest Ref Rng & Units 9/16/2017 9/21/2017 9/25/2017          ED visit TCU TCU   Pathology, Smear Review      (none)      WBC      4.0 - 11.0 thou/uL 12.3 (H)  10.3   RBC      3.80 - 5.40 mill/uL 3.39 (L)  3.85   Hemoglobin      12.0 - 16.0 g/dL 9.0 (L) 10.3 (L) 9.9 (L)   Hematocrit      35.0 - 47.0 % 27.7 (L)  32.7 (L)   MCV      80 - 100 fL 82  85   MCH      27.0 - 34.0 pg 26.5 (L)  25.7 (L)   MCHC      32.0 - 36.0 g/dL 32.5  30.3 (L)   RDW      11.0 - 14.5 % 15.0 (H)  15.4 (H)   Platelets      140 - 440 thou/uL 209  285   MPV      8.5 - 12.5 fL 9.8  10.6     Component      Latest Ref Rng & Units 9/16/2017          ED   Sodium      136 - 145 mmol/L 136   Potassium      3.5 - 5.0 mmol/L 3.2 (L)   Chloride      98 - 107 mmol/L 99   CO2      22 - 31 mmol/L 26   Anion Gap, Calculation      5 - 18 mmol/L 11   Glucose      70 - 125 mg/dL 108   BUN      8 - 28 mg/dL 20   Creatinine      0.60 - 1.10 mg/dL 0.65   Calcium      8.5 - 10.5 mg/dL 8.3 (L)   Bilirubin, Total      0.0 - 1.0 mg/dL 0.3   Protein, Total      6.0 - 8.0 g/dL 5.7 (L)   ALBUMIN      3.5 - 5.0 g/dL 2.6 (L)   Alkaline Phosphatase      45 - 120 U/L 79   AST      0 - 40 U/L 10   ALT      0 - 45 U/L <6   GFR MDRD Af Amer      >60 mL/min/1.73m2 >60   GFR MDRD Non Af Amer      >60 mL/min/1.73m2 >60       Assessment/Plan:  1. Debility. Overall frailty, weakness. Done with therapy. Waiting for family decision on disposition.   2. Diverticulitis. Two recent episodes with most recent CT eval abnormal, concern for malignancy. Had consult with surgeon, will need later follow up.  3. Parkinson's disease. Cont home Sinemet. Baseline.  4. HTN. Cont amlodipine.  Monitor BPs.      Electronically signed by: Ngoc Herman MD

## 2021-06-13 NOTE — PROGRESS NOTES
[facility]      Facility:    [facility] [[id number]]  Code Status: FULL CODE       Chief Complaint/Reason for Visit:  Chief Complaint   Patient presents with     H & P     New admit to TCU for diverticulitis. (H & P 9/20/17).       HPI:   Kita is a 85 y.o. female with Parkinsons disease, anemia, asthma, dementia, GI bleed, glaucoma, hypertension, and word finding difficulty who was admitted to the hospital in July 2017 due to diverticulitis with associated fever. She was treated with Levaquin and flagyl, discharged to TCU, spent time in TCU for rehab and ultimately discharged home around 8/26/17.     She presented to the ED on 9/16/17 due to recurrent abdominal pain and fever. Per the ED note:  Patient is nontoxic and well-appearing.  Lab work in the emergency department appear stable and essentially unchanged.  CT scan however shows remaining thickened colon in the same area as the patient had previously.  Previously the patient had been treated with antibiotics which had improved her symptoms.  Patient remains however at [facility] because of general failure to thrive and some weakness.  Radiologist felt that the findings of the CT scan appeared to be most likely related to malignancy.  Patient will need surgical follow-up.  At this point she is comfortable with no continued abdominal pain.  She is well-appearing.  She is nontoxic.  I believe rather than admitted in the hospital for surgery consult patient may be able to restart antibiotics and follow-up with surgery through a consult at the nursing home.  This will be written as an outpatient.  I do lengthy conversation with the patient and her son and they understand that the patient would need to return to the emergency department should she have worsening pain or changes in her symptoms.     Today:  She denies abdominal pain. Thin. Poor appetite. On supplements. Normal BMs. No urinary sx. Labs not checked  in ED. She is unaware of abnormalities on CT but is aware of diverticulitis diagnosis and treatment. She will need outpatient evaluation from surgeon due to abnormal CT results concerning for malignancy. No fever. Denies cough or congestion. No new vision or hearing problems. Her son lives with her, helps with cares.       Past Medical History:  Past Medical History:   Diagnosis Date     Anemia      Anxiety      Asthma      Dementia      Depression      Diverticulitis      GI bleed      Glaucoma      HTN (hypertension)      Hypotension      Idiopathic neuropathy      OP (osteoporosis)      Parkinson's disease      Skin cancer, basal cell      Vitamin D deficiency      Word finding difficulty 2016           Surgical History:  Past Surgical History:   Procedure Laterality Date     ESOPHAGOGASTRODUODENOSCOPY N/A 2/27/2017    Procedure: ESOPHAGOGASTRODUODENOSCOPY (EGD) with APC cautery usage;  Surgeon: Kelton Sr MD;  Location: [facility];  Service:      HIP ARTHROPLASTY Right      HIP ARTHROPLASTY      left and right full     HIP SURGERY Left      HYSTERECTOMY       KNEE ARTHROSCOPY      right     KNEE SURGERY Right      NEPHRECTOMY         Family History:   Family History   Problem Relation Age of Onset     Parkinsonism Paternal Uncle      Heart disease Mother      Heart attack Father      Pneumonia Brother      Heart murmur Daughter      Diabetes Son      No Medical Problems Son        Social History:    Social History     Social History     Marital status:      Spouse name: N/A     Number of children: N/A     Years of education: N/A     Social History Main Topics     Smoking status: Never Smoker     Smokeless tobacco: Never Used     Alcohol use No     Drug use: No     Sexual activity: No     Other Topics Concern     Not on file     Social History Narrative    ** Merged History Encounter **         ** Data from: 7/10/17 Enc Dept: [facility] [place] [facility]    Her son lives with her in a single family home          ** Data from: 7/23/17 Enc Dept:  EMERGENCY DEPARTMENT    Lives with son       Medications:  Current Outpatient Prescriptions   Medication Sig     acetaminophen (TYLENOL) 325 MG tablet Take 650 mg by mouth 4 (four) times a day as needed for pain.     amLODIPine (NORVASC) 2.5 MG tablet Take 2.5 mg by mouth daily.     carbidopa-levodopa (SINEMET)  mg per tablet Take 2 tablets by mouth 2 (two) times a day.      carbidopa-levodopa-entacapone (STALEVO) -200 mg per tablet Take 0.5 tablets by mouth 2 (two) times a day.     cholecalciferol, vitamin D3, 5,000 unit Tab Take 1 tablet by mouth daily.     ferrous sulfate 325 (65 FE) MG tablet Take 1 tablet by mouth 2 (two) times a day.      levoFLOXacin (LEVAQUIN) 500 MG tablet Take 1 tablet (500 mg total) by mouth daily for 10 days.     meclizine (ANTIVERT) 12.5 mg tablet Take 12.5 mg by mouth daily.     metroNIDAZOLE (FLAGYL) 500 MG tablet Take 1 tablet (500 mg total) by mouth 2 (two) times a day for 7 days.     mirtazapine (REMERON) 45 MG tablet Take 45 mg by mouth bedtime.     moxifloxacin (VIGAMOX) 0.5 % ophthalmic solution Administer 1 drop to the right eye 4 (four) times a day.     omeprazole (PRILOSEC) 20 MG capsule Take 20 mg by mouth 2 (two) times a day before meals.     polyethylene glycol (MIRALAX) 17 gram packet Take 17 g by mouth daily.     prednisoLONE acetate (PRED-FORTE) 1 % ophthalmic suspension Administer 1 drop to the right eye 4 (four) times a day.     senna (SENOKOT) 8.6 mg tablet Take 1 tablet by mouth 2 (two) times a day as needed for constipation.     venlafaxine (EFFEXOR) 37.5 MG tablet Take 37.5 mg by mouth daily.        Allergies:  Allergies   Allergen Reactions     Crestor [Rosuvastatin]      Paxil [Paroxetine Hcl] Unknown     Penicillins      Pramipexole Unknown     Statins-Hmg-Coa Reductase Inhibitors Unknown     Zetia [Ezetimibe] Unknown       Review of Systems:  Pertinent items are noted in HPI.      Physical Exam:   General:  Patient is a thin, alert, pleasant elderly female, no distress.   Vitals: /77, Temp 97.5, Pulse 97, RR 14, O2 sat 97%RA.  HEENT: Head is NCAT. Eyes show no injection or icterus. Nares negative. Oropharynx well hydrated.  Neck: Supple. No tenderness or adenopathy. No JVD.  Lungs: Clear bilaterally. No wheezes.  Cardiovascular: Regular rate and rhythm, normal S1, S2.  Back: No spinal or CVA tenderness.  Abdomen: Soft, no tenderness on exam. Bowel sounds present. No guarding rebound or rigidity.  : Deferred.  Extremities: No edema is noted.  Musculoskeletal: Age related degen changes.   Skin: O/A coccyx.  Psych: Mood appears good.      Labs:  Results for orders placed or performed during the hospital encounter of 07/23/17   Basic Metabolic Panel   Result Value Ref Range    Sodium 137 136 - 145 mmol/L    Potassium 3.4 (L) 3.5 - 5.0 mmol/L    Chloride 102 98 - 107 mmol/L    CO2 25 22 - 31 mmol/L    Anion Gap, Calculation 10 5 - 18 mmol/L    Glucose 163 (H) 70 - 125 mg/dL    Calcium 8.2 (L) 8.5 - 10.5 mg/dL    BUN 23 8 - 28 mg/dL    Creatinine 0.78 0.60 - 1.10 mg/dL    GFR MDRD Af Amer >60 >60 mL/min/1.73m2    GFR MDRD Non Af Amer >60 >60 mL/min/1.73m2       Assessment/Plan:  1. Diverticulitis. Finish Levaquin and Flagyl. Denies current abdominal pain or fever.   2. Abnormal CT. Concerning for malignancy. Referred to surgeon as outpatient for evaluation.  3. Parkinson's disease. Cont home Sinemet.  4. HTN. Cont amlodipine. Monitor BPs.  5. Anemia. Continue on iron.   6. Depression. On Mirtazapine and Venlafaxine.  7. Deconditioning. Here for therapy and rehab.   8. Code status is full code.      Total time greater than 45 minutes, greater than 50% counseling and coordination of care, time spent in interview and examination of patient, review of records, discussion with nursing staff.      Electronically signed by: Ngoc Herman MD

## 2021-06-13 NOTE — PROGRESS NOTES
[facility]    Facility:   [facility] [[id number]]   Code Status: DNR/DNI      CHIEF COMPLAINT/REASON FOR VISIT:  Chief Complaint   Patient presents with     Problem Visit     asked to see       HISTORY:      HPI: Kita is a 85 y.o. female who I was asked to see secondary to overall physical decline including becoming less responsive today.  She has had some so-called good days and bad days over the past week or so and now it does appear that she is pretty much bedbound and having increased issues with perfusion including cool extremities tachypnea and tachycardia.  Really unable to get any saturations.  She has had a slow decline her weight at 117 pounds in about 2 weeks ago 124 pounds.  She did need assistance with feeding.  Her blood pressures have fluctuated but primarily in the 120-140 range but this morning was 129/78.  She was very well comfortable.  She did have end-stage Parkinson's.    After my examination I did talk with charge nurse and actually she and I were able to once again examined her together and confirming the cool extremities the decreased perfusion on the shallow breathing along with the tachycardia and tachypnea.  I also had a chance to speak with the son who did come in today we did talk about the overall condition as well as what some of the family's wishes would like to be and she is DNR/DNI and he did not feel that she wanted any other heroic so we did talk about more of a comfort measure and care for his mom but he was also still trying to get a hold of his sister regarding this decision and in the midst of our conversation she did have ceasing and ceased respirations I did confirm that as well so at about 1025 this morning she was pronounced dead.  I also spoke with him again regarding some of his thoughts and wishes in the charge nurse and staff are also very comforting and trying to work with him regarding this difficult time grieving  time.    Past Medical History:   Diagnosis Date     Anemia      Anxiety      Asthma      Dementia      Depression      Diverticulitis      GI bleed      Glaucoma      HTN (hypertension)      Hypotension      Idiopathic neuropathy      OP (osteoporosis)      Parkinson's disease      Skin cancer, basal cell      Vitamin D deficiency      Word finding difficulty 2016             Family History   Problem Relation Age of Onset     Parkinsonism Paternal Uncle      Heart disease Mother      Heart attack Father      Pneumonia Brother      Heart murmur Daughter      Diabetes Son      No Medical Problems Son      Social History     Social History     Marital status:      Spouse name: N/A     Number of children: N/A     Years of education: N/A     Social History Main Topics     Smoking status: Never Smoker     Smokeless tobacco: Never Used     Alcohol use No     Drug use: No     Sexual activity: No     Other Topics Concern     Not on file     Social History Narrative    ** Merged History Encounter **         ** Data from: 7/10/17 Enc Dept: [facility]    Her son lives with her in a single family home         ** Data from: 7/23/17 Enc Dept: [facility] EMERGENCY DEPARTMENT    Lives with son         Review of Systems  She has had a slow decline of her overall chronic medical conditions including her end-stage Parkinson's.  There were no recent colds flu's chills or fever.  She does have a history of asthma end-stage Parkinson's hypertension cognitive impairment    Current Outpatient Prescriptions:      acetaminophen (TYLENOL) 325 MG tablet, Take 650 mg by mouth 4 (four) times a day as needed for pain., Disp: , Rfl:      amLODIPine (NORVASC) 2.5 MG tablet, Take 2.5 mg by mouth daily., Disp: , Rfl:      carbidopa-levodopa (SINEMET)  mg per tablet, Take 2 tablets by mouth 2 (two) times a day. , Disp: , Rfl:      carbidopa-levodopa-entacapone (STALEVO) -200 mg per tablet, Take 0.5 tablets by mouth 2 (two) times a  day., Disp: , Rfl:      cholecalciferol, vitamin D3, 5,000 unit Tab, Take 1 tablet by mouth daily., Disp: , Rfl:      ferrous sulfate 325 (65 FE) MG tablet, Take 1 tablet by mouth 2 (two) times a day. , Disp: , Rfl:      meclizine (ANTIVERT) 12.5 mg tablet, Take 12.5 mg by mouth daily., Disp: , Rfl:      mirtazapine (REMERON) 45 MG tablet, Take 45 mg by mouth bedtime., Disp: , Rfl:      omeprazole (PRILOSEC) 20 MG capsule, Take 20 mg by mouth 2 (two) times a day before meals., Disp: , Rfl:      polyethylene glycol (MIRALAX) 17 gram packet, Take 17 g by mouth daily., Disp: , Rfl:      senna (SENOKOT) 8.6 mg tablet, Take 1 tablet by mouth 2 (two) times a day as needed for constipation., Disp: , Rfl:      venlafaxine (EFFEXOR) 37.5 MG tablet, Take 37.5 mg by mouth daily. , Disp: , Rfl:     .There were no vitals filed for this visit.  10/27/2017 blood pressure 129/78 pulse 97 respirations 14  Physical Exam  She did have cool extremities.  Really unresponsive to my conversation or questions.  Shallow respirations tachycardia.  LABS:   Lab Results   Component Value Date    WBC 10.3 09/25/2017    HGB 9.9 (L) 09/25/2017    HCT 32.7 (L) 09/25/2017    MCV 85 09/25/2017     09/25/2017     Results for orders placed or performed during the hospital encounter of 07/23/17   Basic Metabolic Panel   Result Value Ref Range    Sodium 137 136 - 145 mmol/L    Potassium 3.4 (L) 3.5 - 5.0 mmol/L    Chloride 102 98 - 107 mmol/L    CO2 25 22 - 31 mmol/L    Anion Gap, Calculation 10 5 - 18 mmol/L    Glucose 163 (H) 70 - 125 mg/dL    Calcium 8.2 (L) 8.5 - 10.5 mg/dL    BUN 23 8 - 28 mg/dL    Creatinine 0.78 0.60 - 1.10 mg/dL    GFR MDRD Af Amer >60 >60 mL/min/1.73m2    GFR MDRD Non Af Amer >60 >60 mL/min/1.73m2           ASSESSMENT:      ICD-10-CM    1. FTT (failure to thrive) in adult R62.7    2. Tachycardia R00.0    3. Tachypnea R06.82    4. Parkinson disease G20        PLAN:    She was pronounced dead at 10:25 AM today.  Had a good  conversation with the son but also with staff and the staff are very comforting with him as well during this difficult time.    For documentation purposes total visit was over 45 minutes to which over 50% was always spent with the patient initially but also with the patient and staff as well as meeting with the son and going over the current findings and conditions as well as end-stage decision making and then also breathing ceased at 10:25 AM.    .    Electronically signed by: Michael Duane Johnson, CNP

## 2021-07-03 NOTE — ADDENDUM NOTE
Addendum Note by Saulo Bender PT at 6/15/2017 11:59 PM     Author: Saulo Bender PT Service: -- Author Type: Physical Therapist    Filed: 6/16/2017 10:49 AM Date of Service: 6/15/2017 11:59 PM Status: Signed    : Saulo Bender PT (Physical Therapist)    Encounter addended by: Saulo Bender PT on: 6/16/2017 10:49 AM<BR>     Actions taken: Charge Capture section accepted

## 2021-07-21 ENCOUNTER — RECORDS - HEALTHEAST (OUTPATIENT)
Dept: ADMINISTRATIVE | Facility: CLINIC | Age: 86
End: 2021-07-21